# Patient Record
Sex: MALE | Race: WHITE | Employment: FULL TIME | URBAN - METROPOLITAN AREA
[De-identification: names, ages, dates, MRNs, and addresses within clinical notes are randomized per-mention and may not be internally consistent; named-entity substitution may affect disease eponyms.]

---

## 2017-03-15 DIAGNOSIS — N52.9 ERECTILE DYSFUNCTION, UNSPECIFIED ERECTILE DYSFUNCTION TYPE: ICD-10-CM

## 2017-03-15 RX ORDER — SILDENAFIL CITRATE 20 MG/1
TABLET ORAL
Qty: 90 TABLET | Refills: 7 | Status: SHIPPED | OUTPATIENT
Start: 2017-03-15 | End: 2018-10-25

## 2017-03-15 NOTE — TELEPHONE ENCOUNTER
viagra  -Pharmacy change to [facility] from [facility]    Last Written Prescription Date: 12/9/2016  Last Fill Quantity: 90,  # refills: 11   [place] [facility] or [facility] prescribing provider: 12/9/2016                                             BP Readings from Last 3 Encounters:   12/09/16 118/74   01/19/16 124/70   12/29/15 114/70     Prescription approved per [facility] Refill Protocol.  Vera Mejia RN

## 2018-03-06 ENCOUNTER — TELEPHONE (OUTPATIENT)
Dept: FAMILY MEDICINE | Facility: CLINIC | Age: 53
End: 2018-03-06

## 2018-03-06 NOTE — PROGRESS NOTES
"  SUBJECTIVE:                                                    Sina Stoner is a 52 year old male who presents to clinic today for the following health issues:    Hand Pain    Onset: 2 weeks - started with MCP stiffness    Description:   Location: right hand  Character: Sharp and Dull ache    Intensity: moderate, severe    Progression of Symptoms: worse    Accompanying Signs & Symptoms:  Other symptoms: stiffness- hard to  objects-pain in MCP joints-  No paresthesia. No left hand pain.  Pain in ball of right foot yesterday - better today.  No elbow, knee, hip, or spine pain.     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    Alleviating factors:  Improved by: acetaminophen    Therapies Tried and outcome: ibuprofen helps little- tylenol works good    Significant family history of Osteoarthritis and Rheumatoid arthritis.    Problem list and histories reviewed & adjusted, as indicated.  Additional history: as documented    ROS:  Constitutional, HEENT, cardiovascular, pulmonary, GI, , musculoskeletal, neuro, skin, endocrine and psych systems are negative, except as otherwise noted.    This document serves as a record of the services and decisions personally performed and made by Sina Herrera MD. It was created on his behalf by Vaibhav Duong, a trained medical scribe. The creation of this document is based the provider's statements to the medical scribe.  Vaibhav Duong March 8, 2018 10:26 AM  OBJECTIVE:                                                    /80  Pulse 81  Temp 98.1  F (36.7  C) (Oral)  Ht 5' 10.5\" (1.791 m)  Wt 219 lb (99.3 kg)  SpO2 97%  BMI 30.98 kg/m2 Body mass index is 30.98 kg/(m^2).   GENERAL: healthy, alert, well nourished, well hydrated, no distress  MS: 2nd, 3rd, and 4th MCP synovitis. Negative tinel test, otherwise extremities- no gross deformities noted, no edema  Psychiatric: mentation " appears normal and affect normal/bright    Diagnostic test results:  Results for orders placed or performed in visit on 03/08/18 (from the past 24 hour(s))   Lipid panel reflex to direct LDL Fasting   Result Value Ref Range    Cholesterol 311 (H) <200 mg/dL    Triglycerides 348 (H) <150 mg/dL    HDL Cholesterol 48 >39 mg/dL    LDL Cholesterol Calculated 193 (H) <100 mg/dL    Non HDL Cholesterol 263 (H) <130 mg/dL   Erythrocyte sedimentation rate auto   Result Value Ref Range    Sed Rate 7 0 - 20 mm/h   Basic metabolic panel  (Ca, Cl, CO2, Creat, Gluc, K, Na, BUN)   Result Value Ref Range    Sodium 139 133 - 144 mmol/L    Potassium 4.6 3.4 - 5.3 mmol/L    Chloride 107 94 - 109 mmol/L    Carbon Dioxide 25 20 - 32 mmol/L    Anion Gap 7 3 - 14 mmol/L    Glucose 91 70 - 99 mg/dL    Urea Nitrogen 19 7 - 30 mg/dL    Creatinine 1.27 (H) 0.66 - 1.25 mg/dL    GFR Estimate 59 (L) >60 mL/min/1.7m2    GFR Estimate If Black 72 >60 mL/min/1.7m2    Calcium 9.1 8.5 - 10.1 mg/dL   Uric acid   Result Value Ref Range    Uric Acid 7.4 (H) 3.5 - 7.2 mg/dL        ASSESSMENT/PLAN:       Sina was seen today for musculoskeletal problem.    Diagnoses and all orders for this visit:    Metacarpophalangeal joint pain of right hand        - Unclear etiology. Could be overuse vs osteoarthritis vs rheumatoid arthritis vs gout. Unlikely to be Carpal Tunnel Syndrome based on history and exam. Will check labs to rule out RA and Gout (elevated - this could be gout and the steroids should help.) If Rheumatoid factor returns positive will refer to Rheumatologist. In the meantime will start a Prednisone taper to alleviate pain.  -     Rheumatoid factor  -     Erythrocyte sedimentation rate auto  -     predniSONE (DELTASONE) 20 MG tablet; Take 3 tabs (60 mg) by mouth daily x 3 days, 2 tabs (40 mg) daily x 3 days, 1 tab (20 mg) daily x 3 days, then 1/2 tab (10 mg) x 3 days.  -     Uric acid - elevated     Hyperlipidemia LDL goal <130 - ran out of meds -          - Will recheck lipid panel today since he is due.  -     Lipid panel reflex to direct LDL Fasting  -     Basic metabolic panel  (Ca, Cl, CO2, Creat, Gluc, K, Na, BUN)    Erectile dysfunction, unspecified erectile dysfunction type        - No acute issues, requests refill today.  -     sildenafil (REVATIO) 20 MG tablet; Take 2-5 tablets ( mg) by mouth daily as needed    Risks, benefits and alternatives of treatments discussed. Plan agreed on.      Followup: Will call, return to clinic, or go to ED if worsening or symptoms not improving as discussed.    See patient instructions.     The information in this document, created by the medical scribe for me, accurately reflects the services I personally performed and the decisions made by me. I have reviewed and approved this document for accuracy prior to leaving the patient care area.        Alonzo Herrera MD   Pager: [phone]

## 2018-03-06 NOTE — TELEPHONE ENCOUNTER
Patient requesting an OV with Dr. Herrera re: hand pain, 2x weeks. loss strength. Requesting to be seen this week. 3/8/17. Please advise.  Cell: [phone]  Thank you  Cherri Man

## 2018-03-08 ENCOUNTER — OFFICE VISIT (OUTPATIENT)
Dept: FAMILY MEDICINE | Facility: CLINIC | Age: 53
End: 2018-03-08
Payer: COMMERCIAL

## 2018-03-08 VITALS
SYSTOLIC BLOOD PRESSURE: 120 MMHG | WEIGHT: 219 LBS | DIASTOLIC BLOOD PRESSURE: 80 MMHG | TEMPERATURE: 98.1 F | HEIGHT: 71 IN | HEART RATE: 81 BPM | BODY MASS INDEX: 30.66 KG/M2 | OXYGEN SATURATION: 97 %

## 2018-03-08 DIAGNOSIS — M25.541 METACARPOPHALANGEAL JOINT PAIN OF RIGHT HAND: Primary | ICD-10-CM

## 2018-03-08 DIAGNOSIS — N52.9 ERECTILE DYSFUNCTION, UNSPECIFIED ERECTILE DYSFUNCTION TYPE: ICD-10-CM

## 2018-03-08 DIAGNOSIS — E78.5 HYPERLIPIDEMIA LDL GOAL <130: ICD-10-CM

## 2018-03-08 LAB
ANION GAP SERPL CALCULATED.3IONS-SCNC: 7 MMOL/L (ref 3–14)
BUN SERPL-MCNC: 19 MG/DL (ref 7–30)
CALCIUM SERPL-MCNC: 9.1 MG/DL (ref 8.5–10.1)
CHLORIDE SERPL-SCNC: 107 MMOL/L (ref 94–109)
CHOLEST SERPL-MCNC: 311 MG/DL
CO2 SERPL-SCNC: 25 MMOL/L (ref 20–32)
CREAT SERPL-MCNC: 1.27 MG/DL (ref 0.66–1.25)
ERYTHROCYTE [SEDIMENTATION RATE] IN BLOOD BY WESTERGREN METHOD: 7 MM/H (ref 0–20)
GFR SERPL CREATININE-BSD FRML MDRD: 59 ML/MIN/1.7M2
GLUCOSE SERPL-MCNC: 91 MG/DL (ref 70–99)
HDLC SERPL-MCNC: 48 MG/DL
LDLC SERPL CALC-MCNC: 193 MG/DL
NONHDLC SERPL-MCNC: 263 MG/DL
POTASSIUM SERPL-SCNC: 4.6 MMOL/L (ref 3.4–5.3)
RHEUMATOID FACT SER NEPH-ACNC: <20 IU/ML (ref 0–20)
SODIUM SERPL-SCNC: 139 MMOL/L (ref 133–144)
TRIGL SERPL-MCNC: 348 MG/DL
URATE SERPL-MCNC: 7.4 MG/DL (ref 3.5–7.2)

## 2018-03-08 PROCEDURE — 85652 RBC SED RATE AUTOMATED: CPT | Performed by: FAMILY MEDICINE

## 2018-03-08 PROCEDURE — 80048 BASIC METABOLIC PNL TOTAL CA: CPT | Performed by: FAMILY MEDICINE

## 2018-03-08 PROCEDURE — 99214 OFFICE O/P EST MOD 30 MIN: CPT | Performed by: FAMILY MEDICINE

## 2018-03-08 PROCEDURE — 84550 ASSAY OF BLOOD/URIC ACID: CPT | Performed by: FAMILY MEDICINE

## 2018-03-08 PROCEDURE — 80061 LIPID PANEL: CPT | Performed by: FAMILY MEDICINE

## 2018-03-08 PROCEDURE — 86431 RHEUMATOID FACTOR QUANT: CPT | Performed by: FAMILY MEDICINE

## 2018-03-08 PROCEDURE — 36415 COLL VENOUS BLD VENIPUNCTURE: CPT | Performed by: FAMILY MEDICINE

## 2018-03-08 RX ORDER — SILDENAFIL CITRATE 20 MG/1
40-100 TABLET ORAL DAILY PRN
Qty: 30 TABLET | Refills: 11 | Status: SHIPPED | OUTPATIENT
Start: 2018-03-08 | End: 2019-04-02

## 2018-03-08 RX ORDER — PREDNISONE 20 MG/1
TABLET ORAL
Qty: 20 TABLET | Refills: 0 | Status: SHIPPED | OUTPATIENT
Start: 2018-03-08 | End: 2018-06-11

## 2018-03-08 ASSESSMENT — ANXIETY QUESTIONNAIRES
3. WORRYING TOO MUCH ABOUT DIFFERENT THINGS: NOT AT ALL
7. FEELING AFRAID AS IF SOMETHING AWFUL MIGHT HAPPEN: NOT AT ALL
IF YOU CHECKED OFF ANY PROBLEMS ON THIS QUESTIONNAIRE, HOW DIFFICULT HAVE THESE PROBLEMS MADE IT FOR YOU TO DO YOUR WORK, TAKE CARE OF THINGS AT HOME, OR GET ALONG WITH OTHER PEOPLE: NOT DIFFICULT AT ALL
6. BECOMING EASILY ANNOYED OR IRRITABLE: SEVERAL DAYS
2. NOT BEING ABLE TO STOP OR CONTROL WORRYING: NOT AT ALL
GAD7 TOTAL SCORE: 2
5. BEING SO RESTLESS THAT IT IS HARD TO SIT STILL: NOT AT ALL
1. FEELING NERVOUS, ANXIOUS, OR ON EDGE: SEVERAL DAYS

## 2018-03-08 ASSESSMENT — PATIENT HEALTH QUESTIONNAIRE - PHQ9: 5. POOR APPETITE OR OVEREATING: NOT AT ALL

## 2018-03-08 NOTE — LETTER
My Depression Action Plan  Name: Sina Stoner   Date of Birth 1965  Date: 3/8/2018    My doctor: Sina Herrera   My clinic: [facility] PRIOR [address] [id number] [phone]          GREEN    ZONE   Good Control    What it looks like:     Things are going generally well. You have normal up s and down s. You may even feel depressed from time to time, but bad moods usually last less than a day.   What you need to do:  1. Continue to care for yourself (see self care plan)  2. Check your depression survival kit and update it as needed  3. Follow your physician s recommendations including any medication.  4. Do not stop taking medication unless you consult with your physician first.           YELLOW         ZONE Getting Worse    What it looks like:     Depression is starting to interfere with your life.     It may be hard to get out of bed; you may be starting to isolate yourself from others.    Symptoms of depression are starting to last most all day and this has happened for several days.     You may have suicidal thoughts but they are not constant.   What you need to do:     1. Call your care team, your response to treatment will improve if you keep your care team informed of your progress. Yellow periods are signs an adjustment may need to be made.     2. Continue your self-care, even if you have to fake it!    3. Talk to someone in your support network    4. Open up your depression survival kit           RED    ZONE Medical Alert - Get Help    What it looks like:     Depression is seriously interfering with your life.     You may experience these or other symptoms: You can t get out of bed most days, can t work or engage in other necessary activities, you have trouble taking care of basic hygiene, or basic responsibilities, thoughts of suicide or death that will not go away, self-injurious behavior.     What you need to do:  1. Call your care  team and request a same-day appointment. If they are not available (weekends or after hours) call your local crisis line, emergency room or 911.      Electronically signed by: Katelynn Mcmullen, March 8, 2018    Depression Self Care Plan / Survival Kit    Self-Care for Depression  Here s the deal. Your body and mind are really not as separate as most people think.  What you do and think affects how you feel and how you feel influences what you do and think. This means if you do things that people who feel good do, it will help you feel better.  Sometimes this is all it takes.  There is also a place for medication and therapy depending on how severe your depression is, so be sure to consult with your medical provider and/ or Behavioral Health Consultant if your symptoms are worsening or not improving.     In order to better manage my stress, I will:    Exercise  Get some form of exercise, every day. This will help reduce pain and release endorphins, the  feel good  chemicals in your brain. This is almost as good as taking antidepressants!  This is not the same as joining a gym and then never going! (they count on that by the way ) It can be as simple as just going for a walk or doing some gardening, anything that will get you moving.      Hygiene   Maintain good hygiene (Get out of bed in the morning, Make your bed, Brush your teeth, Take a shower, and Get dressed like you were going to work, even if you are unemployed).  If your clothes don't fit try to get ones that do.    Diet  I will strive to eat foods that are good for me, drink plenty of water, and avoid excessive sugar, caffeine, alcohol, and other mood-altering substances.  Some foods that are helpful in depression are: complex carbohydrates, B vitamins, flaxseed, fish or fish oil, fresh fruits and vegetables.    Psychotherapy  I agree to participate in Individual Therapy (if recommended).    Medication  If prescribed medications, I agree to take them.   Missing doses can result in serious side effects.  I understand that drinking alcohol, or other illicit drug use, may cause potential side effects.  I will not stop my medication abruptly without first discussing it with my provider.    Staying Connected With Others  I will stay in touch with my friends, family members, and my primary care provider/team.    Use your imagination  Be creative.  We all have a creative side; it doesn t matter if it s oil painting, sand castles, or mud pies! This will also kick up the endorphins.    Witness Beauty  (AKA stop and smell the roses) Take a look outside, even in mid-winter. Notice colors, textures. Watch the squirrels and birds.     Service to others  Be of service to others.  There is always someone else in need.  By helping others we can  get out of ourselves  and remember the really important things.  This also provides opportunities for practicing all the other parts of the program.    Humor  Laugh and be silly!  Adjust your TV habits for less news and crime-drama and more comedy.    Control your stress  Try breathing deep, massage therapy, biofeedback, and meditation. Find time to relax each day.     My support system    Clinic Contact:  Phone number:    Contact 1:  Phone number:    Contact 2:  Phone number:    [facility]uther[facility]/:  Phone number:    Therapist:  Phone number:    Local crisis center:    Phone number:    Other community support:  Phone number:

## 2018-03-08 NOTE — MR AVS SNAPSHOT
"              After Visit Summary   3/8/2018    Sina Stoner    MRN: [id number]           Patient Information     Date Of Birth          1965        Visit Information        Provider Department      3/8/2018 10:00 AM Sina Herrera MD [facility]        Today's Diagnoses     Metacarpophalangeal joint pain of right hand    -  1    Hyperlipidemia LDL goal <130        Erectile dysfunction, unspecified erectile dysfunction type           Follow-ups after your visit        Who to contact     If you have questions or need follow up information about today's clinic visit or your schedule please contact [facility] directly at [phone].  Normal or non-critical lab and imaging results will be communicated to you by [employer], letter or phone within 4 business days after the clinic has received the results. If you do not hear from us within 7 days, please contact the clinic through [employer] or phone. If you have a critical or abnormal lab result, we will notify you by phone as soon as possible.  Submit refill requests through [facility] or call your pharmacy and they will forward the refill request to us. Please allow 3 business days for your refill to be completed.          Additional Information About Your Visit        MyChart Information     [facility] gives you secure access to your electronic health record. If you see a primary care provider, you can also send messages to your care team and make appointments. If you have questions, please call your primary care clinic.  If you do not have a primary care provider, please call [phone] and they will assist you.        Care EveryWhere ID     This is your Care EveryWhere ID. This could be used by other organizations to access your [place] medical records  [id number]        Your Vitals Were     Pulse Temperature Height Pulse Oximetry BMI (Body Mass Index)       81 98.1  F (36.7  C) (Oral) 5' 10.5\" (1.791 m) 97% 30.98 kg/m2        " Blood Pressure from Last 3 Encounters:   03/08/18 120/80   12/09/16 118/74   01/19/16 124/70    Weight from Last 3 Encounters:   03/08/18 219 lb (99.3 kg)   12/09/16 216 lb (98 kg)   01/19/16 214 lb (97.1 kg)              We Performed the Following     Basic metabolic panel  (Ca, Cl, CO2, Creat, Gluc, K, Na, BUN)     Erythrocyte sedimentation rate auto     Lipid panel reflex to direct LDL Fasting     Rheumatoid factor     Uric acid          Today's Medication Changes          These changes are accurate as of 3/8/18 10:33 AM.  If you have any questions, ask your nurse or doctor.               Start taking these medicines.        Dose/Directions    predniSONE 20 MG tablet   Commonly known as:  DELTASONE   Used for:  Metacarpophalangeal joint pain of right hand   Started by:  Sina Herrera MD        Take 3 tabs (60 mg) by mouth daily x 3 days, 2 tabs (40 mg) daily x 3 days, 1 tab (20 mg) daily x 3 days, then 1/2 tab (10 mg) x 3 days.   Quantity:  20 tablet   Refills:  0         These medicines have changed or have updated prescriptions.        Dose/Directions    * sildenafil 20 MG tablet   Commonly known as:  REVATIO   This may have changed:  Another medication with the same name was added. Make sure you understand how and when to take each.   Used for:  Erectile dysfunction, unspecified erectile dysfunction type   Changed by:  Sina Herrera MD        TAKE 2 TO 5 TABLETS BY MOUTH DAILY AS NEEDED   Quantity:  90 tablet   Refills:  7       * sildenafil 20 MG tablet   Commonly known as:  REVATIO   This may have changed:  You were already taking a medication with the same name, and this prescription was added. Make sure you understand how and when to take each.   Used for:  Erectile dysfunction, unspecified erectile dysfunction type   Changed by:  Sina Herrera MD        Dose:   mg   Take 2-5 tablets ( mg) by mouth daily as needed   Quantity:  30 tablet   Refills:  11       * Notice:  This list has  2 medication(s) that are the same as other medications prescribed for you. Read the directions carefully, and ask your doctor or other care provider to review them with you.      Stop taking these medicines if you haven't already. Please contact your care team if you have questions.     buPROPion 300 MG 24 hr tablet   Commonly known as:  WELLBUTRIN XL   Stopped by:  Sina Herrera MD           LORazepam 0.5 MG tablet   Commonly known as:  ATIVAN   Stopped by:  Sina Herrera MD                Where to get your medicines      These medications were sent to [facility] - [place], [place] - [address] [address]     Phone:  [phone]     predniSONE 20 MG tablet         Some of these will need a paper prescription and others can be bought over the counter.  Ask your nurse if you have questions.     Bring a paper prescription for each of these medications     sildenafil 20 MG tablet                Primary Care Provider Office Phone # Fax #    Sina Herrera [phone]       [address] [employer] [place], [place] [id number]        Equal Access to Services     [facility]: Hadii rogelio jordan hadasho Soomaali, waaxda luqadaha, qaybta kaalmada adeegyaalphonse, gricelda montilla . So [facility] [phone].    ATENCIÓN: Si habla español, tiene a gallegos disposición servicios gratuitos de asistencia lingüística. Llame al [phone].    We comply with applicable federal civil rights laws and [place] laws. We do not discriminate on the basis of race, color, national origin, age, disability, sex, sexual orientation, or gender identity.            Thank you!     Thank you for choosing [facility]  for your care. Our goal is always to provide you with excellent care. Hearing back from our patients is one way we can continue to improve our services. Please take a few minutes to complete the written survey that you may  receive in the mail after your visit with us. Thank you!             Your Updated Medication List - Protect others around you: Learn how to safely use, store and throw away your medicines at www.disposemymeds.org.          This list is accurate as of 3/8/18 10:33 AM.  Always use your most recent med list.                   Brand Name Dispense Instructions for use Diagnosis    MULTIVITAMINS PO      Take  by mouth daily.        pravastatin 20 MG tablet    PRAVACHOL    90 tablet    Take 1 tablet (20 mg) by mouth daily    Hyperlipidemia LDL goal <130       predniSONE 20 MG tablet    DELTASONE    20 tablet    Take 3 tabs (60 mg) by mouth daily x 3 days, 2 tabs (40 mg) daily x 3 days, 1 tab (20 mg) daily x 3 days, then 1/2 tab (10 mg) x 3 days.    Metacarpophalangeal joint pain of right hand       * sildenafil 20 MG tablet    REVATIO    90 tablet    TAKE 2 TO 5 TABLETS BY MOUTH DAILY AS NEEDED    Erectile dysfunction, unspecified erectile dysfunction type       * sildenafil 20 MG tablet    REVATIO    30 tablet    Take 2-5 tablets ( mg) by mouth daily as needed    Erectile dysfunction, unspecified erectile dysfunction type       vitamin D 1000 UNITS capsule      Take 1 capsule by mouth daily.        * Notice:  This list has 2 medication(s) that are the same as other medications prescribed for you. Read the directions carefully, and ask your doctor or other care provider to review them with you.

## 2018-03-09 RX ORDER — ATORVASTATIN CALCIUM 20 MG/1
20 TABLET, FILM COATED ORAL DAILY
Qty: 90 TABLET | Refills: 1 | Status: SHIPPED | OUTPATIENT
Start: 2018-03-09 | End: 2018-10-25

## 2018-03-09 ASSESSMENT — ANXIETY QUESTIONNAIRES: GAD7 TOTAL SCORE: 2

## 2018-03-09 ASSESSMENT — PATIENT HEALTH QUESTIONNAIRE - PHQ9: SUM OF ALL RESPONSES TO PHQ QUESTIONS 1-9: 4

## 2018-03-09 NOTE — PROGRESS NOTES
Dear Quirino,    Here is a summary of your recent test results:  -Kidney function (GFR) is normal.  -Sodium is normal.  -Potassium is normal.  -Glucose (diabetic screening test) is normal.  -LDL(bad) cholesterol level is very elevated,  A diet high in fat and simple carbohydrates, genetics and being overweight can contribute to this. ADVISE: restarting medication to lower your heart disease risk - I sent in atorvastatin 20 mg daily. Exercise, a low fat, low carbohydrate diet and weight control are helpful to improve this.  Rechecking your fasting cholesterol panel in 3 months is recommended (Lipid w/ LDL reflex, DX:hyperlipidemia)  -No signs of rheumatoid arthritis by labs.    -uric acid level is elevated and you could have gout.      For additional lab test information, labtestsonline.org is an excellent reference.     Thank you very much for trusting me and [facility].     Healthy regards,  Alonzo Herrera MD

## 2018-05-21 DIAGNOSIS — N52.9 ERECTILE DYSFUNCTION, UNSPECIFIED ERECTILE DYSFUNCTION TYPE: ICD-10-CM

## 2018-05-22 NOTE — TELEPHONE ENCOUNTER
"Requested Prescriptions   Pending Prescriptions Disp Refills     sildenafil (REVATIO) 20 MG tablet [Pharmacy Med Name: SILDENAFIL 20MG TAB] 90 tablet 7      Last Written Prescription Date:  03/08/2018  Last Fill Quantity: 30,  # refills: 11   Last office visit: 3/8/2018  Sig: TAKE TWO TO FIVE TABLETS BY MOUTH ONCE DAILY AS NEEDED    Erectile Dysfuction Protocol Passed    5/21/2018 10:22 AM       Passed - Absence of nitrates on medication list       Passed - Absence of Alpha Blockers on Med list       Passed - Recent (12 mo) or future (30 days) visit within the authorizing provider's specialty    Patient had office visit in the last 12 months or has a visit in the next 30 days with authorizing provider or within the authorizing provider's specialty.  See \"Patient Info\" tab in inbasket, or \"Choose Columns\" in Meds & Orders section of the refill encounter.           Passed - Patient is age 18 or older          "

## 2018-05-23 RX ORDER — SILDENAFIL CITRATE 20 MG/1
TABLET ORAL
Qty: 90 TABLET | Refills: 7 | OUTPATIENT
Start: 2018-05-23

## 2018-06-07 NOTE — PROGRESS NOTES
"  SUBJECTIVE:                                                    Sina Stoner is a 53 year old male who presents to clinic today for the following health issues:    Thumb Pain    Onset: roughly 3 weeks ago    Description:   Location: left thumb  Character: feels stiff with knots     Intensity: severe    Progression of Symptoms: better during the day, seems worse laying down, night time is bad as well     Accompanying Signs & Symptoms:  Other symptoms: none    History:   Previous similar pain: YES- happened in the rt thumb      Precipitating factors:   Trauma or overuse: no but he type a lot  For work    Alleviating factors:  Improved by: heat, ice, massage and Ibuprofen    Therapies Tried and outcome: tylenol was helping a little bit for a while, massaging it helps     The patient has been experiencing left thumb pain and clicking for the past three weeks. He feels like he has decreased strength of the MCP joint. He works on computers daily. He previously had trigger finger in his right thumb that was treated with physical therapy. He denies swelling and redness at the site.       Problem list and histories reviewed & adjusted, as indicated.  Additional history: as documented      ROS:  Constitutional, HEENT, cardiovascular, pulmonary, GI, , musculoskeletal, neuro, skin, endocrine and psych systems are negative, except as otherwise noted.    This document serves as a record of the services and decisions personally performed and made by Sina Herrera MD. It was created on his behalf by Ursula Mendieta, a trained medical scribe. The creation of this document is based on the provider's statements to the medical scribe.  Ursula Mendieta 11:51 AM 6/11/2018  OBJECTIVE:                                                    /78  Pulse 58  Temp 97.8  F (36.6  C) (Oral)  Ht 1.791 m (5' 10.5\")  Wt 98.9 kg (218 lb)  SpO2 99%  BMI 30.84 kg/m2 Body mass index is 30.84 kg/(m^2).   GENERAL: healthy, alert, well " nourished, well hydrated, no distress  MS: Left Thumb: flexor tendon nodule near the first MCP joint, otherwise extremities- no gross deformities noted, no edema  SKIN: no suspicious lesions, no rashes    Diagnostic test results:  none      ASSESSMENT/PLAN:         Sina was seen today for musculoskeletal problem.    Diagnoses and all orders for this visit:    Trigger finger of left thumb - Injection given in the clinic today. Reviewed stretching and tendon lengthening exercises. Ice as needed. If symptoms persist for another 3 weeks, consider referral to hand therapy.   After consent was obtained, using sterile technique the left first finger palmar side was prepped, and using a 25g 5/8 inch needle, 0.75 cc's of 1% plain Lidocaine and 10 mg (40mg/ml) of Kenalog was then injected into the tendon nodule/sheath and the needle withdrawn.  The procedure was well tolerated.  The patient is asked to continue to rest the treated area for 5-7 more days before resuming regular activities.  It may be more painful for the first 1-2 days.  Watch for fever, or increased swelling or persistent pain. Call or return to clinic prn if such symptoms occur or the pain fails to improve as anticipated.      Risks, benefits and alternatives of treatments discussed. Plan agreed on.      Followup: Return in about 3 weeks (around 7/2/2018) for Recheck.    See patient instructions.     The information in this document, created by a scribe for me, accurately reflects the services I personally performed and the decisions made by me. I have reviewed and approved this document for accuracy.      Alonzo Herrera MD   Pager: [phone]

## 2018-06-11 ENCOUNTER — OFFICE VISIT (OUTPATIENT)
Dept: FAMILY MEDICINE | Facility: CLINIC | Age: 53
End: 2018-06-11
Payer: COMMERCIAL

## 2018-06-11 VITALS
OXYGEN SATURATION: 99 % | WEIGHT: 218 LBS | BODY MASS INDEX: 30.52 KG/M2 | DIASTOLIC BLOOD PRESSURE: 78 MMHG | TEMPERATURE: 97.8 F | HEART RATE: 58 BPM | HEIGHT: 71 IN | SYSTOLIC BLOOD PRESSURE: 108 MMHG

## 2018-06-11 DIAGNOSIS — M65.312 TRIGGER FINGER OF LEFT THUMB: Primary | ICD-10-CM

## 2018-06-11 PROCEDURE — 20551 NJX 1 TENDON ORIGIN/INSJ: CPT | Performed by: FAMILY MEDICINE

## 2018-06-11 NOTE — MR AVS SNAPSHOT
"              After Visit Summary   6/11/2018    Sina Stoner    MRN: [id number]           Patient Information     Date Of Birth          1965        Visit Information        Provider Department      6/11/2018 11:40 AM Sina Herrera MD [facility]        Today's Diagnoses     Trigger finger of left thumb    -  1       Follow-ups after your visit        Follow-up notes from your care team     Return in about 3 weeks (around 7/2/2018) for Recheck.      Who to contact     If you have questions or need follow up information about today's clinic visit or your schedule please contact [facility] directly at [phone].  Normal or non-critical lab and imaging results will be communicated to you by MyChart, letter or phone within 4 business days after the clinic has received the results. If you do not hear from us within 7 days, please contact the clinic through [employer] or phone. If you have a critical or abnormal lab result, we will notify you by phone as soon as possible.  Submit refill requests through [employer] or call your pharmacy and they will forward the refill request to us. Please allow 3 business days for your refill to be completed.          Additional Information About Your Visit        MyChart Information     [employer] gives you secure access to your electronic health record. If you see a primary care provider, you can also send messages to your care team and make appointments. If you have questions, please call your primary care clinic.  If you do not have a primary care provider, please call [phone] and they will assist you.        Care EveryWhere ID     This is your Care EveryWhere ID. This could be used by other organizations to access your [place] medical records  [id number]        Your Vitals Were     Pulse Temperature Height Pulse Oximetry BMI (Body Mass Index)       58 97.8  F (36.6  C) (Oral) 5' 10.5\" (1.791 m) 99% 30.84 kg/m2        Blood Pressure from " Last 3 Encounters:   06/11/18 108/78   03/08/18 120/80   12/09/16 118/74    Weight from Last 3 Encounters:   06/11/18 218 lb (98.9 kg)   03/08/18 219 lb (99.3 kg)   12/09/16 216 lb (98 kg)              We Performed the Following     INJECTION SINGLE TENDON ORIGIN/INSERTION        Primary Care Provider Office Phone # Fax #    Sina Herrera [phone]       [facility] [address]        Equal Access to Services     [facility]: Hadii aad ku hadasho Soomaali, waaxda luqadaha, qaybta kaalmada adeegyaalphonse, gricelda montilla . So [facility] [phone].    ATENCIÓN: Si habla español, tiene a gallegos disposición servicios gratuitos de asistencia lingüística. [facility] [phone].    We comply with applicable federal civil rights laws and [place] laws. We do not discriminate on the basis of race, color, national origin, age, disability, sex, sexual orientation, or gender identity.            Thank you!     Thank you for choosing [facility]  for your care. Our goal is always to provide you with excellent care. Hearing back from our patients is one way we can continue to improve our services. Please take a few minutes to complete the written survey that you may receive in the mail after your visit with us. Thank you!             Your Updated Medication List - Protect others around you: Learn how to safely use, store and throw away your medicines at www.disposemymeds.org.          This list is accurate as of 6/11/18 12:24 PM.  Always use your most recent med list.                   Brand Name Dispense Instructions for use Diagnosis    atorvastatin 20 MG tablet    LIPITOR    90 tablet    Take 1 tablet (20 mg) by mouth daily    Hyperlipidemia LDL goal <130       MULTIVITAMINS PO      Take  by mouth daily.        * sildenafil 20 MG tablet    REVATIO    90 tablet    TAKE 2 TO 5 TABLETS BY MOUTH DAILY AS NEEDED    Erectile dysfunction, unspecified erectile  dysfunction type       * sildenafil 20 MG tablet    REVATIO    30 tablet    Take 2-5 tablets ( mg) by mouth daily as needed    Erectile dysfunction, unspecified erectile dysfunction type       vitamin D 1000 units capsule      Take 1 capsule by mouth daily.        * Notice:  This list has 2 medication(s) that are the same as other medications prescribed for you. Read the directions carefully, and ask your doctor or other care provider to review them with you.

## 2018-07-12 DIAGNOSIS — E78.5 HYPERLIPIDEMIA LDL GOAL <130: ICD-10-CM

## 2018-07-12 LAB
CHOLEST SERPL-MCNC: 216 MG/DL
HDLC SERPL-MCNC: 45 MG/DL
LDLC SERPL CALC-MCNC: 120 MG/DL
NONHDLC SERPL-MCNC: 171 MG/DL
TRIGL SERPL-MCNC: 255 MG/DL

## 2018-07-12 PROCEDURE — 80061 LIPID PANEL: CPT | Performed by: FAMILY MEDICINE

## 2018-07-12 PROCEDURE — 36415 COLL VENOUS BLD VENIPUNCTURE: CPT | Performed by: FAMILY MEDICINE

## 2018-07-18 NOTE — PROGRESS NOTES
Dear Quirino,    Here is a summary of your recent test results:  -Cholesterol levels are at your goal levels.  ADVISE: Continuing your medication, a regular exercise program with at least 30 minutes of aerobic exercise 3-4 days/week ( 45 minutes 4-6 days/week if weight loss needed), and a low saturated fat,/low carbohydrate diet are helpful to maintain this.  Rechecking your fasting cholesterol panel in 12 months is recommended (Lipid w/ LDL reflex).    For additional lab test information, labtestsonline.org is an excellent reference.    In addition, here is a list of due or overdue Health Maintenance reminders:  HIV SCREEN (SYSTEM ASSIGNED) due on 04/21/1983  Wellness Visit with your Primary Provider - yearly due on 04/01/2012  Prostate Test (PSA) - yearly due on 03/10/2016    Please call us at [phone] (or use [employer]) to address the above recommendations if needed.           Thank you very much for trusting me and [facility].     Healthy regards,  Alonzo Herrera MD

## 2018-10-25 ENCOUNTER — OFFICE VISIT (OUTPATIENT)
Dept: FAMILY MEDICINE | Facility: CLINIC | Age: 53
End: 2018-10-25
Payer: COMMERCIAL

## 2018-10-25 VITALS
SYSTOLIC BLOOD PRESSURE: 112 MMHG | HEIGHT: 71 IN | HEART RATE: 80 BPM | WEIGHT: 215 LBS | OXYGEN SATURATION: 97 % | DIASTOLIC BLOOD PRESSURE: 80 MMHG | TEMPERATURE: 98 F | BODY MASS INDEX: 30.1 KG/M2

## 2018-10-25 DIAGNOSIS — E78.5 HYPERLIPIDEMIA LDL GOAL <130: ICD-10-CM

## 2018-10-25 DIAGNOSIS — Z23 NEED FOR PROPHYLACTIC VACCINATION AND INOCULATION AGAINST INFLUENZA: ICD-10-CM

## 2018-10-25 DIAGNOSIS — M65.312 TRIGGER FINGER OF LEFT THUMB: Primary | ICD-10-CM

## 2018-10-25 PROCEDURE — 20551 NJX 1 TENDON ORIGIN/INSJ: CPT | Performed by: FAMILY MEDICINE

## 2018-10-25 PROCEDURE — 90471 IMMUNIZATION ADMIN: CPT | Performed by: FAMILY MEDICINE

## 2018-10-25 PROCEDURE — 99213 OFFICE O/P EST LOW 20 MIN: CPT | Mod: 25 | Performed by: FAMILY MEDICINE

## 2018-10-25 PROCEDURE — 90686 IIV4 VACC NO PRSV 0.5 ML IM: CPT | Performed by: FAMILY MEDICINE

## 2018-10-25 RX ORDER — ATORVASTATIN CALCIUM 20 MG/1
20 TABLET, FILM COATED ORAL DAILY
Qty: 90 TABLET | Refills: 3 | Status: SHIPPED | OUTPATIENT
Start: 2018-10-25 | End: 2019-08-20

## 2018-10-25 ASSESSMENT — ANXIETY QUESTIONNAIRES
7. FEELING AFRAID AS IF SOMETHING AWFUL MIGHT HAPPEN: NOT AT ALL
IF YOU CHECKED OFF ANY PROBLEMS ON THIS QUESTIONNAIRE, HOW DIFFICULT HAVE THESE PROBLEMS MADE IT FOR YOU TO DO YOUR WORK, TAKE CARE OF THINGS AT HOME, OR GET ALONG WITH OTHER PEOPLE: NOT DIFFICULT AT ALL
1. FEELING NERVOUS, ANXIOUS, OR ON EDGE: NOT AT ALL
3. WORRYING TOO MUCH ABOUT DIFFERENT THINGS: NOT AT ALL
2. NOT BEING ABLE TO STOP OR CONTROL WORRYING: NOT AT ALL
5. BEING SO RESTLESS THAT IT IS HARD TO SIT STILL: NOT AT ALL
GAD7 TOTAL SCORE: 0
6. BECOMING EASILY ANNOYED OR IRRITABLE: NOT AT ALL

## 2018-10-25 ASSESSMENT — PATIENT HEALTH QUESTIONNAIRE - PHQ9
5. POOR APPETITE OR OVEREATING: NOT AT ALL
SUM OF ALL RESPONSES TO PHQ QUESTIONS 1-9: 5

## 2018-10-25 NOTE — MR AVS SNAPSHOT
After Visit Summary   10/25/2018    Sina Stoner    MRN: [id number]           Patient Information     Date Of Birth          1965        Visit Information        Provider Department      10/25/2018 1:40 PM Sina Herrera MD [facility]        Today's Diagnoses     Trigger finger of left thumb    -  1    Hyperlipidemia LDL goal <130        Need for prophylactic vaccination and inoculation against influenza           Follow-ups after your visit        Follow-up notes from your care team     Return in about 3 months (around 1/25/2019), or if symptoms worsen or fail to improve.      Who to contact     If you have questions or need follow up information about today's clinic visit or your schedule please contact [facility] directly at [phone].  Normal or non-critical lab and imaging results will be communicated to you by for; to (do) [employer], letter or phone within 4 business days after the clinic has received the results. If you do not hear from us within 7 days, please contact the clinic through for; to (do) [employer] or phone. If you have a critical or abnormal lab result, we will notify you by phone as soon as possible.  Submit refill requests through [employer] or call your pharmacy and they will forward the refill request to us. Please allow 3 business days for your refill to be completed.          Additional Information About Your Visit        MyChart Information     [employer] gives you secure access to your electronic health record. If you see a primary care provider, you can also send messages to your care team and make appointments. If you have questions, please call your primary care clinic.  If you do not have a primary care provider, please call [phone] and they will assist you.        Care EveryWhere ID     This is your Care EveryWhere ID. This could be used by other organizations to access your [place] medical records  [id number]        Your Vitals Were     Pulse  "Temperature Height Pulse Oximetry BMI (Body Mass Index)       80 98  F (36.7  C) (Oral) 5' 10.5\" (1.791 m) 97% 30.41 kg/m2        Blood Pressure from Last 3 Encounters:   10/25/18 112/80   06/11/18 108/78   03/08/18 120/80    Weight from Last 3 Encounters:   10/25/18 215 lb (97.5 kg)   06/11/18 218 lb (98.9 kg)   03/08/18 219 lb (99.3 kg)              We Performed the Following          ADMIN VACCINE, FIRST [45859]     HC FLU VAC PRESRV FREE QUAD SPLIT VIR 3+YRS IM  [66053]     INJECTION SINGLE TENDON ORIGIN/INSERTION          Where to get your medicines      These medications were sent to [facility] - [address] [address]     Phone:  [phone]     atorvastatin 20 MG tablet          Primary Care Provider Office Phone # Fax #    Sina Herrera [phone]       [address] [place] [address]        Equal Access to Services     [facility] : Hadii aad ku hadasho Soomaali, waaxda luqadaha, qaybta kaalmada adeegyada, gricelda walker haypayton montilla . So [facility] [phone].    ATENCIÓN: Si habla español, tiene a gallegos disposición servicios gratuitos de asistencia lingüística. [facility] [phone].    We comply with applicable federal civil rights laws and [place] laws. We do not discriminate on the basis of race, color, national origin, age, disability, sex, sexual orientation, or gender identity.            Thank you!     Thank you for choosing [facility]  for your care. Our goal is always to provide you with excellent care. Hearing back from our patients is one way we can continue to improve our services. Please take a few minutes to complete the written survey that you may receive in the mail after your visit with us. Thank you!             Your Updated Medication List - Protect others around you: Learn how to safely use, store and throw away your medicines at " www.disposemymeds.org.          This list is accurate as of 10/25/18  3:12 PM.  Always use your most recent med list.                   Brand Name Dispense Instructions for use Diagnosis    atorvastatin 20 MG tablet    LIPITOR    90 tablet    Take 1 tablet (20 mg) by mouth daily    Hyperlipidemia LDL goal <130       MULTIVITAMINS PO      Take  by mouth daily.        sildenafil 20 MG tablet    REVATIO    30 tablet    Take 2-5 tablets ( mg) by mouth daily as needed    Erectile dysfunction, unspecified erectile dysfunction type       vitamin D 1000 units capsule      Take 1 capsule by mouth daily.

## 2018-10-25 NOTE — PROGRESS NOTES
"  SUBJECTIVE:                                                      Sina Stoner is a 53 year old male who presents to clinic today for the following health issues:    Left Thumb Pain LOV 06/11/2018  (4 months ago)  Quirino complains of a returning ache and trigger finger in his left thumb since the second week of September (5 weeks ago) -- when the pain becomes severe, radiation toward his second finger.  He had an injection done during LOV which helped immensely. His right thumb had similar symptoms which has not recurred.     Cholesterol   Quirino reports that he has stopped taking Lipitor as he forget to regularly medicate.       Problem list and histories reviewed & adjusted, as indicated.  Additional history: as documented    ROS:  Constitutional, HEENT, cardiovascular, pulmonary, GI, , musculoskeletal, neuro, skin, endocrine and psych systems are negative, except as otherwise noted.    This document serves as a record of the services and decisions personally performed and made by Sina Herrera MD. It was created on his behalf by Magdy Estrella, a trained medical scribe. The creation of this document is based the provider's statements to the medical scribe.  Scribe Magdy Estrella 2:12 PM, October 25, 2018    OBJECTIVE:                                                    /80  Pulse 80  Temp 98  F (36.7  C) (Oral)  Ht 1.791 m (5' 10.5\")  Wt 97.5 kg (215 lb)  SpO2 97%  BMI 30.41 kg/m2 Body mass index is 30.41 kg/(m^2).   GENERAL: healthy, alert, well nourished, well hydrated, no distress  HENT: ear canals- normal; TMs- normal; Nose- normal; Mouth- no ulcers, no lesions  NECK: no tenderness, no adenopathy, no asymmetry, no masses, no stiffness; thyroid- normal to palpation  RESP: lungs clear to auscultation - no rales, no rhonchi, no wheezes  CV: regular rates and rhythm, normal S1 S2, no S3 or S4 and no murmur, no click or rub -  ABDOMEN: soft, no tenderness, no  hepatosplenomegaly, no masses, normal bowel sounds    After " "consent was obtained, using sterile technique the left thumb was prepped and .5 cc's of 1% plain Lidocaine and 10 mg Kenalog was then injected and the needle withdrawn.  The procedure was well tolerated.  The patient is asked to continue to rest the treated area for 5-7 more days before resuming regular activities.  It may be more painful for the first 1-2 days.  Watch for fever, or increased swelling or persistent pain in knee. Call or return to clinic prn if such symptoms occur or the pain fails to improve as anticipated.    Diagnostic test results:  Results for orders placed or performed in visit on 07/12/18   Lipid panel reflex to direct LDL Fasting   Result Value Ref Range    Cholesterol 216 (H) <200 mg/dL    Triglycerides 255 (H) <150 mg/dL    HDL Cholesterol 45 >39 mg/dL    LDL Cholesterol Calculated 120 (H) <100 mg/dL    Non HDL Cholesterol 171 (H) <130 mg/dL     Lipid panel reviewed, multiple levels elevated.    ASSESSMENT/PLAN:                                                      Sina was seen today for musculoskeletal problem.    Diagnoses and all orders for this visit:    Trigger finger of left thumb  -     INJECTION SINGLE TENDON ORIGIN/INSERTION    Hyperlipidemia LDL goal <130 - Restart medication to control elevated cholesterol levels   -     atorvastatin (LIPITOR) 20 MG tablet; Take 1 tablet (20 mg) by mouth daily    Need for prophylactic vaccination and inoculation against influenza  -     HC FLU VAC PRESRV FREE QUAD SPLIT VIR 3+YRS IM  [03299]  -          ADMIN VACCINE, FIRST [66006]      Risks, benefits and alternatives of treatments discussed. Plan agreed on.      Followup: Return in about 3 months (around 1/25/2019), or if symptoms worsen or fail to improve.    See patient instructions.       BMI:   Estimated body mass index is 30.41 kg/(m^2) as calculated from the following:    Height as of this encounter: 1.791 m (5' 10.5\").    Weight as of this encounter: 97.5 kg (215 lb).   Weight " management plan: Discussed healthy diet and exercise guidelines and patient will follow up in 12 months in clinic to re-evaluate.     The information in this document, created by the medical scribe for me, accurately reflects the services I personally performed and the decisions made by me. I have reviewed and approved this document for accuracy prior to leaving the patient care area.  2:28 PM, 10/25/18        Alonzo Herrera MD   Pager: [phone]

## 2018-10-26 ASSESSMENT — ANXIETY QUESTIONNAIRES: GAD7 TOTAL SCORE: 0

## 2019-04-02 DIAGNOSIS — N52.9 ERECTILE DYSFUNCTION, UNSPECIFIED ERECTILE DYSFUNCTION TYPE: ICD-10-CM

## 2019-04-02 RX ORDER — SILDENAFIL CITRATE 20 MG/1
TABLET ORAL
Qty: 30 TABLET | Refills: 5 | Status: SHIPPED | OUTPATIENT
Start: 2019-04-02 | End: 2019-08-20

## 2019-04-02 NOTE — TELEPHONE ENCOUNTER
"Requested Prescriptions   Pending Prescriptions Disp Refills     sildenafil (REVATIO) 20 MG tablet [Pharmacy Med Name: SILDENAFIL 20MG TAB] 30 tablet 11     Sig: TAKE 2 TO 5 TABLETS BY MOUTH ONCE DAILY AS NEEDED    Erectile Dysfuction Protocol Passed - 4/2/2019 10:42 AM       Passed - Absence of nitrates on medication list       Passed - Absence of Alpha Blockers on Med list       Passed - Recent (12 mo) or future (30 days) visit within the authorizing provider's specialty    Patient had office visit in the last 12 months or has a visit in the next 30 days with authorizing provider or within the authorizing provider's specialty.  See \"Patient Info\" tab in inbasket, or \"Choose Columns\" in Meds & Orders section of the refill encounter.             Passed - Medication is active on med list       Passed - Patient is age 18 or older        Prescription approved per [facility] Refill Protocol.  Nhung Montgomery RN   [facility] - Triage     "

## 2019-05-10 ENCOUNTER — ALLIED HEALTH/NURSE VISIT (OUTPATIENT)
Dept: NURSING | Facility: CLINIC | Age: 54
End: 2019-05-10
Payer: COMMERCIAL

## 2019-05-10 ENCOUNTER — TELEPHONE (OUTPATIENT)
Dept: FAMILY MEDICINE | Facility: CLINIC | Age: 54
End: 2019-05-10

## 2019-05-10 DIAGNOSIS — Z23 ENCOUNTER FOR IMMUNIZATION: Primary | ICD-10-CM

## 2019-05-10 PROCEDURE — 99207 ZZC NO CHARGE NURSE ONLY: CPT

## 2019-05-10 PROCEDURE — 90471 IMMUNIZATION ADMIN: CPT

## 2019-05-10 PROCEDURE — 90707 MMR VACCINE SC: CPT

## 2019-05-10 NOTE — TELEPHONE ENCOUNTER
Patient notified of the below. He would like to get MMR booster. He was born after 1957 and is travelling to a [place] with [place] on [place].    Patient scheduled for this afternoon for MA only visit.    Patient verbalized understanding and agreed with plan.    Lidia Owens, BS, RN, PHN  [facility]) [phone]        The majority of people born before 1957 are likely to have been infected naturally and therefore are presumed to be protected against measles, mumps, and rubella. However, if serologic testing (blood tests that look for antibodies in your blood) indicates that you are not immune, MMR should be administered.     Some people should NOT get this vaccine. Please let us know if you:   -have any severe, life-threatening allergies.  -are pregnant, or think you may be pregnant. Pregnant women should wait to get MMR vaccine until after they are no longer pregnant. Women should avoid getting pregnant for at least 1 month after getting the MMR vaccine.   -have a weakened immune system due to disease (such as cancer or HIV/AIDS) or medical treatments (such as radiation, immunotherapy, steroids, or chemotherapy.  -have a parent, brother, or sister with a history of immune system problems.   -have ever had a condition that makes you bruise or bleed easily.  -have recently had a blood transfusion or received other blood products. You might be advised to postpone MMR vaccination for 3 months or more.   -have tuberculosis.   -have gotten any other vaccines in the past 4 weeks. Live vaccines close together might not work as well.   -are not feeling well. A mild illness, such as a cold, is usually not a reason to postpone a vaccination. Someone who is moderately or severely ill should probably wait. Your doctor can advise you.     With any medicine, including vaccines, there is a chance of reactions. These are usually mild and go away on their own, but serious reactions are also possible.    Getting the MMR vaccine is much safer than getting measles, mumps, or rubella disease. Most people who get the MMR vaccine do not have any problems with it.       Also, serologic testing and the MMR vaccine may or may not be covered by insurance. It would be a good idea to check with your insurance company on coverage before receiving these.       Please let me know if you have further questions or concerns.

## 2019-08-02 NOTE — PROGRESS NOTES
"Subjective     Sina Stoner is a 54 year old male who presents to clinic today for the following health issues:    HPI   Musculoskeletal problem/pain    Duration: 3 years    Description  Location: left thumb joint    Intensity:  5/10    Accompanying signs and symptoms: numbness and tingling, limited ROM    History  Previous similar problem: YES  Previous evaluation:  Injection     Precipitating or alleviating factors:  Trauma or overuse: no   History: yes  Aggravating factors include: in the AM its worse, flexing and bending makes the pain more noticeable     Therapies tried and outcome: cortisone shot- effective but wore off quickly -- would like a repeat injection.      Reviewed and updated as needed this visit by provider:  Tobacco  Allergies  Meds  Problems  Med Hx  Surg Hx  Fam Hx         Review of Systems   Constitutional, HEENT, cardiovascular, pulmonary, GI, , musculoskeletal, neuro, skin, endocrine and psych systems are negative, except as otherwise noted.  This document serves as a record of the services and decisions personally performed and made by Sina Herrera MD. It was created on his behalf by Magdy Estrella, a trained medical scribe. The creation of this document is based the provider's statements to the medical scribe.  Scribe Magdy Estrella 10:15 AM, August 3, 2019    Objective   /70 (BP Location: Right arm, Cuff Size: Adult Regular)   Pulse 81   Temp 98.3  F (36.8  C) (Oral)   Ht 1.791 m (5' 10.5\")   Wt 88.5 kg (195 lb)   SpO2 98%   BMI 27.58 kg/m   Body mass index is 27.58 kg/m .  Physical Exam   GENERAL: healthy, alert, well nourished, well hydrated, no distress  HENT: ear canals- normal; TMs- normal; Nose- normal; Mouth- no ulcers, no lesions  NECK: no tenderness, no adenopathy, no asymmetry, no masses, no stiffness; thyroid- normal to palpation  RESP: lungs clear to auscultation - no rales, no rhonchi, no wheezes  CV: regular rates and rhythm, normal S1 S2, no S3 or S4 and no murmur, " no click or rub -  ABDOMEN: soft, no tenderness, no  hepatosplenomegaly, no masses, normal bowel sounds  MS: extremities- no gross deformities noted, no edema  SKIN: no suspicious lesions, no rashes    Procedure Note:   After consent was obtained, using sterile technique the left thumb was prepped and .5 cc's of 1% plain Lidocaine and 10 mg Kenalog was then injected and the needle withdrawn.  The procedure was well tolerated.  The patient is asked to continue to rest the treated area for 5-7 more days before resuming regular activities.  It may be more painful for the first 1-2 days.  Watch for fever, or increased swelling or persistent pain in knee. Call or return to clinic prn if such symptoms occur or the pain fails to improve as anticipated.      Assessment & Plan   Sina was seen today for thumb discomfort.    Diagnoses and all orders for this visit:    Trigger finger of left thumb  -     INJECTION SINGLE TENDON ORIGIN/INSERTION      See Patient Instructions    Return in about 6 months (around 2/3/2020), or if symptoms worsen or fail to improve, for recheck.    The information in this document, created by the medical scribe for me, accurately reflects the services I personally performed and the decisions made by me. I have reviewed and approved this document for accuracy prior to leaving the patient care area.  10:18 AM, 08/03/19        Alonzo Herrera MD   Pager - [phone]  [facility]

## 2019-08-03 ENCOUNTER — OFFICE VISIT (OUTPATIENT)
Dept: FAMILY MEDICINE | Facility: CLINIC | Age: 54
End: 2019-08-03
Payer: COMMERCIAL

## 2019-08-03 VITALS
BODY MASS INDEX: 27.3 KG/M2 | TEMPERATURE: 98.3 F | WEIGHT: 195 LBS | HEART RATE: 81 BPM | SYSTOLIC BLOOD PRESSURE: 110 MMHG | HEIGHT: 71 IN | DIASTOLIC BLOOD PRESSURE: 70 MMHG | OXYGEN SATURATION: 98 %

## 2019-08-03 DIAGNOSIS — M65.312 TRIGGER FINGER OF LEFT THUMB: Primary | ICD-10-CM

## 2019-08-03 PROCEDURE — 20551 NJX 1 TENDON ORIGIN/INSJ: CPT | Performed by: FAMILY MEDICINE

## 2019-08-03 ASSESSMENT — ANXIETY QUESTIONNAIRES
6. BECOMING EASILY ANNOYED OR IRRITABLE: NOT AT ALL
IF YOU CHECKED OFF ANY PROBLEMS ON THIS QUESTIONNAIRE, HOW DIFFICULT HAVE THESE PROBLEMS MADE IT FOR YOU TO DO YOUR WORK, TAKE CARE OF THINGS AT HOME, OR GET ALONG WITH OTHER PEOPLE: SOMEWHAT DIFFICULT
1. FEELING NERVOUS, ANXIOUS, OR ON EDGE: SEVERAL DAYS
3. WORRYING TOO MUCH ABOUT DIFFERENT THINGS: SEVERAL DAYS
7. FEELING AFRAID AS IF SOMETHING AWFUL MIGHT HAPPEN: NOT AT ALL
GAD7 TOTAL SCORE: 2
5. BEING SO RESTLESS THAT IT IS HARD TO SIT STILL: NOT AT ALL
2. NOT BEING ABLE TO STOP OR CONTROL WORRYING: NOT AT ALL

## 2019-08-03 ASSESSMENT — MIFFLIN-ST. JEOR: SCORE: 1738.7

## 2019-08-03 ASSESSMENT — PATIENT HEALTH QUESTIONNAIRE - PHQ9
SUM OF ALL RESPONSES TO PHQ QUESTIONS 1-9: 3
5. POOR APPETITE OR OVEREATING: NOT AT ALL

## 2019-08-04 ASSESSMENT — ANXIETY QUESTIONNAIRES: GAD7 TOTAL SCORE: 2

## 2019-08-20 ENCOUNTER — OFFICE VISIT (OUTPATIENT)
Dept: FAMILY MEDICINE | Facility: CLINIC | Age: 54
End: 2019-08-20
Payer: COMMERCIAL

## 2019-08-20 VITALS
SYSTOLIC BLOOD PRESSURE: 98 MMHG | OXYGEN SATURATION: 98 % | TEMPERATURE: 98.2 F | HEIGHT: 71 IN | BODY MASS INDEX: 27.86 KG/M2 | HEART RATE: 59 BPM | WEIGHT: 199 LBS | DIASTOLIC BLOOD PRESSURE: 58 MMHG

## 2019-08-20 DIAGNOSIS — N52.9 ERECTILE DYSFUNCTION, UNSPECIFIED ERECTILE DYSFUNCTION TYPE: ICD-10-CM

## 2019-08-20 DIAGNOSIS — E55.9 VITAMIN D DEFICIENCY: ICD-10-CM

## 2019-08-20 DIAGNOSIS — L65.9 ALOPECIA: ICD-10-CM

## 2019-08-20 DIAGNOSIS — Z00.00 ENCOUNTER FOR WELLNESS EXAMINATION: Primary | ICD-10-CM

## 2019-08-20 DIAGNOSIS — E78.5 HYPERLIPIDEMIA LDL GOAL <100: ICD-10-CM

## 2019-08-20 DIAGNOSIS — F41.9 ANXIETY: ICD-10-CM

## 2019-08-20 LAB
ALBUMIN SERPL-MCNC: 4 G/DL (ref 3.4–5)
ALP SERPL-CCNC: 95 U/L (ref 40–150)
ALT SERPL W P-5'-P-CCNC: 34 U/L (ref 0–70)
ANION GAP SERPL CALCULATED.3IONS-SCNC: 5 MMOL/L (ref 3–14)
AST SERPL W P-5'-P-CCNC: 15 U/L (ref 0–45)
BILIRUB SERPL-MCNC: 1.5 MG/DL (ref 0.2–1.3)
BUN SERPL-MCNC: 15 MG/DL (ref 7–30)
CALCIUM SERPL-MCNC: 8.8 MG/DL (ref 8.5–10.1)
CHLORIDE SERPL-SCNC: 110 MMOL/L (ref 94–109)
CHOLEST SERPL-MCNC: 158 MG/DL
CO2 SERPL-SCNC: 27 MMOL/L (ref 20–32)
CREAT SERPL-MCNC: 1.26 MG/DL (ref 0.66–1.25)
ERYTHROCYTE [DISTWIDTH] IN BLOOD BY AUTOMATED COUNT: 13 % (ref 10–15)
GFR SERPL CREATININE-BSD FRML MDRD: 64 ML/MIN/{1.73_M2}
GLUCOSE SERPL-MCNC: 96 MG/DL (ref 70–99)
HCT VFR BLD AUTO: 46.6 % (ref 40–53)
HDLC SERPL-MCNC: 56 MG/DL
HGB BLD-MCNC: 15.5 G/DL (ref 13.3–17.7)
LDLC SERPL CALC-MCNC: 73 MG/DL
MCH RBC QN AUTO: 30.5 PG (ref 26.5–33)
MCHC RBC AUTO-ENTMCNC: 33.3 G/DL (ref 31.5–36.5)
MCV RBC AUTO: 92 FL (ref 78–100)
NONHDLC SERPL-MCNC: 102 MG/DL
PLATELET # BLD AUTO: 184 10E9/L (ref 150–450)
POTASSIUM SERPL-SCNC: 4.2 MMOL/L (ref 3.4–5.3)
PROT SERPL-MCNC: 7 G/DL (ref 6.8–8.8)
PSA SERPL-ACNC: 0.73 UG/L (ref 0–4)
RBC # BLD AUTO: 5.09 10E12/L (ref 4.4–5.9)
SODIUM SERPL-SCNC: 142 MMOL/L (ref 133–144)
TRIGL SERPL-MCNC: 144 MG/DL
WBC # BLD AUTO: 6.1 10E9/L (ref 4–11)

## 2019-08-20 PROCEDURE — 85027 COMPLETE CBC AUTOMATED: CPT | Performed by: FAMILY MEDICINE

## 2019-08-20 PROCEDURE — 80053 COMPREHEN METABOLIC PANEL: CPT | Performed by: FAMILY MEDICINE

## 2019-08-20 PROCEDURE — 80061 LIPID PANEL: CPT | Performed by: FAMILY MEDICINE

## 2019-08-20 PROCEDURE — 99396 PREV VISIT EST AGE 40-64: CPT | Performed by: FAMILY MEDICINE

## 2019-08-20 PROCEDURE — 36415 COLL VENOUS BLD VENIPUNCTURE: CPT | Performed by: FAMILY MEDICINE

## 2019-08-20 PROCEDURE — G0103 PSA SCREENING: HCPCS | Performed by: FAMILY MEDICINE

## 2019-08-20 RX ORDER — ATORVASTATIN CALCIUM 20 MG/1
20 TABLET, FILM COATED ORAL DAILY
Qty: 90 TABLET | Refills: 3 | Status: SHIPPED | OUTPATIENT
Start: 2019-08-20 | End: 2020-09-18

## 2019-08-20 RX ORDER — FINASTERIDE 1 MG/1
1 TABLET, FILM COATED ORAL DAILY
Qty: 90 TABLET | Refills: 3 | Status: SHIPPED | OUTPATIENT
Start: 2019-08-20 | End: 2020-09-17

## 2019-08-20 RX ORDER — SILDENAFIL 50 MG/1
50 TABLET, FILM COATED ORAL DAILY PRN
Qty: 30 TABLET | Refills: 11 | Status: SHIPPED | OUTPATIENT
Start: 2019-08-20 | End: 2020-09-17

## 2019-08-20 ASSESSMENT — MIFFLIN-ST. JEOR: SCORE: 1756.85

## 2019-08-20 NOTE — PROGRESS NOTES
SUBJECTIVE:   CC: Sina Stoner is an 54 year old male who presents for preventive health visit.     Healthy Habits:    Do you get at least three servings of calcium containing foods daily (dairy, green leafy vegetables, etc.)? yes    Amount of exercise or daily activities, outside of work: 2 day(s) per week    Problems taking medications regularly No    Medication side effects: No    Have you had an eye exam in the past two years? yes    Do you see a dentist twice per year? yes    Do you have sleep apnea, excessive snoring or daytime drowsiness?no    Hyperlipidemia Follow-Up    Are you having any of the following symptoms? (Select all that apply)  No complaints of shortness of breath, chest pain or pressure.  No increased sweating or nausea with activity.  No left-sided neck or arm pain.  No complaints of pain in calves when walking 1-2 blocks.    Are you regularly taking any medication or supplement to lower your cholesterol?   Yes- rx Atorvastatin 20mg    Are you having muscle aches or other side effects that you think could be caused by your cholesterol lowering medication?  No    Quirino's LDL levels have been trending downwards and has been taking his atorvastatin daily without complications.     Today's PHQ-2 Score: 0-0  PHQ-2 ( 1999 [employer]) 8/20/2019 10/25/2018   Q1: Little interest or pleasure in doing things 0 0   Q2: Feeling down, depressed or hopeless 0 0   PHQ-2 Score 0 0     Abuse: Current or Past(Physical, Sexual or Emotional)- No  Do you feel safe in your environment? Yes    Social History     Tobacco Use     Smoking status: Never Smoker     Smokeless tobacco: Never Used   Substance Use Topics     Alcohol use: Yes     Alcohol/week: 0.0 oz     Comment: 0-2 beers per week     If you drink alcohol do you typically have >3 drinks per day or >7 drinks per week? No                      Last PSA:   PSA   Date Value Ref Range Status   03/10/2015 1.28 0 - 4 ug/L Final       Reviewed orders with patient.  "Reviewed health maintenance and updated orders accordingly - Yes    Reviewed and updated as needed this visit by clinical staff  Tobacco  Allergies  Meds  Med Hx  Surg Hx  Fam Hx  Soc Hx        Reviewed and updated as needed this visit by Provider  Surg Hx        ROS:  Constitutional, HEENT, cardiovascular, pulmonary, GI, , musculoskeletal, neuro, skin, endocrine and psych systems are negative, except as otherwise noted.    Previous cortisone injection was helpful for his left trigger finger.     This document serves as a record of the services and decisions personally performed and made by Sina Herrera MD. It was created on his behalf by Magdy Estrella, a trained medical scribe. The creation of this document is based the provider's statements to the medical scribe.  Scribe Magdy Estrella 9:00 AM, August 20, 2019    OBJECTIVE:   BP 98/58 (BP Location: Right arm, Patient Position: Chair, Cuff Size: Adult Large)   Pulse 59   Temp 98.2  F (36.8  C) (Oral)   Ht 1.791 m (5' 10.5\")   Wt 90.3 kg (199 lb)   SpO2 98%   BMI 28.15 kg/m    EXAM:  GENERAL: healthy, alert, well nourished, well hydrated, no distress  EYES: Eyes grossly normal to inspection, extraocular movements - intact, and PERRL  HENT: ear canals- normal; TMs- normal; Nose- normal; Mouth- no ulcers, no lesions  NECK: no tenderness, no adenopathy, no asymmetry, no masses, no stiffness; thyroid- normal to palpation  RESP: lungs clear to auscultation - no rales, no rhonchi, no wheezes  CV: regular rates and rhythm, normal S1 S2, no S3 or S4 and no murmur, no click or rub -  ABDOMEN: soft, no tenderness, no  hepatosplenomegaly, no masses, normal bowel sounds  MS: extremities- no gross deformities noted, no edema  SKIN: no suspicious lesions, no rashes  BACK: no CVA tenderness, no paralumbar tenderness  - male: testicles- normal, no atrophy, no masses;  no inguinal hernias  RECTAL- male: no masses, no hemorhoids, Prostate- symmetric, no  nodularity, no masses, no " "hypertrophy  PSYCH: Alert and oriented times 3; speech- coherent , normal rate and volume; able to articulate logical thoughts, able to abstract reason, no tangential thoughts, no hallucinations or delusions, affect- normal  LYMPHATICS: ant. cervical- normal, post. cervical- normal, axillary- normal, supraclavicular- normal, inguinal- normal  NEURO: strength and tone- normal, sensory exam- grossly normal, mentation- intact speech- normal, reflexes- symmetric    ASSESSMENT/PLAN:   Sina was seen today for physical.    Diagnoses and all orders for this visit:    Encounter for wellness examination  -     CBC with platelets  -     Prostate spec antigen screen  -     Comprehensive metabolic panel (BMP + Alb, Alk Phos, ALT, AST, Total. Bili, TP)    Hyperlipidemia LDL goal <100 - Improving, recommended diet and exercise,   -     Lipid panel reflex to direct LDL Fasting  -     atorvastatin (LIPITOR) 20 MG tablet; Take 1 tablet (20 mg) by mouth daily  -     Comprehensive metabolic panel (BMP + Alb, Alk Phos, ALT, AST, Total. Bili, TP)    Erectile dysfunction, unspecified erectile dysfunction type - Stable, continue:  -     sildenafil (VIAGRA) 50 MG tablet; Take 1 tablet (50 mg) by mouth daily as needed (erectile dysfunction)    Anxiety - Stable, doing well.    Vitamin D deficiency - Stable, continue supplements    Alopecia - New diagnosis, begin:   -     finasteride (PROPECIA) 1 MG tablet; Take 1 tablet (1 mg) by mouth daily    COUNSELING:  Reviewed preventive health counseling, as reflected in patient instructions       Regular exercise       Healthy diet/nutrition    Estimated body mass index is 28.15 kg/m  as calculated from the following:    Height as of this encounter: 1.791 m (5' 10.5\").    Weight as of this encounter: 90.3 kg (199 lb).  Weight management plan: Discussed healthy diet and exercise guidelines   reports that he has never smoked. He has never used smokeless tobacco.    Counseling Resources:  ATP IV " Guidelines  Pooled Cohorts Equation Calculator  FRAX Risk Assessment  ICSI Preventive Guidelines  Dietary Guidelines for Americans, 2010  USDA's MyPlate  ASA Prophylaxis  Lung CA Screening    The information in this document, created by the medical scribe for me, accurately reflects the services I personally performed and the decisions made by me. I have reviewed and approved this document for accuracy prior to leaving the patient care area.  9:07 AM, 08/20/19    Sina Herrera MD  [facility] [place]

## 2019-08-23 NOTE — RESULT ENCOUNTER NOTE
Dear Quirino,    Here is a summary of your recent test results:  -Normal red blood cell (hgb) levels, normal white blood cell count and normal platelet levels.  -PSA (prostate specific antigen) test is normal.  This indicates a low likelihood of prostate cancer.  ADVISE: rechecking this in 1 year.  -Cholesterol levels are at your goal levels.  ADVISE: continuing your medication, a regular exercise program with at least 150 minutes of aerobic exercise per week, and eating a low saturated fat/low carbohydrate diet.  Also, you should recheck this fasting cholesterol panel in 12 months.  -Liver and gallbladder tests are normal (ALT,AST, Alk phos, bilirubin), kidney function is essentially normal (Cr, GFR), sodium is normal, potassium is normal, calcium is normal, glucose is normal.    For additional lab test information, labtestsonline.org is an excellent reference.    In addition, here is a list of due or overdue Health Maintenance reminders:  Zoster (Shingles) Vaccine(1 of 2) due on 04/21/2015    Please call us at [phone] (or use thrdPlace) to address the above recommendations if needed.           Thank you very much for trusting me and [facility].     Healthy regards,  Alonzo Herrera MD

## 2019-10-01 ENCOUNTER — HEALTH MAINTENANCE LETTER (OUTPATIENT)
Age: 54
End: 2019-10-01

## 2019-12-12 ENCOUNTER — TELEPHONE (OUTPATIENT)
Dept: FAMILY MEDICINE | Facility: CLINIC | Age: 54
End: 2019-12-12

## 2019-12-12 NOTE — TELEPHONE ENCOUNTER
Reason for Call:  Other appointment    Detailed comments: Pt would like an appt this week or next if possible with Sharon.  Pt was seen for this in Aug.  Pt is having left thumb problem  - pain, stiffness and triggers every day    Phone Number Patient can be reached at: Cell number on file:    Telephone Information:   Mobile [phone]       Best Time: anytime    Can we leave a detailed message on this number? YES    Call taken on 12/12/2019 at 10:26 AM by Devora Jesus

## 2019-12-16 ENCOUNTER — TELEPHONE (OUTPATIENT)
Dept: FAMILY MEDICINE | Facility: CLINIC | Age: 54
End: 2019-12-16

## 2019-12-16 DIAGNOSIS — M65.312 TRIGGER FINGER OF LEFT THUMB: Primary | ICD-10-CM

## 2019-12-16 NOTE — TELEPHONE ENCOUNTER
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 Offer Friday the 27th at 3 or 4:20 otherwise he has to wait until the following week. Tuesday has a lot of openings in the afternoon. Katelynn Mcmullen CMA

## 2019-12-16 NOTE — TELEPHONE ENCOUNTER
Instead of waiting or seeing someone else patient would like a referral for an orthopedic.     Sent request to Tre for orders for a referral.,      Nirali Nicole

## 2019-12-16 NOTE — TELEPHONE ENCOUNTER
Patient thinks it would be best to have a referral to an orthopedic to look at his thumb.  Said you have spoken to him before about the need to possibly have surgery so patient thinks it may be time to see an orthopedic.     Can you place orders for a referral and let him know once placed and then patient will try to schedule with an orthopedic.      iNrali Nicole

## 2019-12-18 ENCOUNTER — TELEPHONE (OUTPATIENT)
Dept: ORTHOPEDICS | Facility: CLINIC | Age: 54
End: 2019-12-18

## 2019-12-18 ENCOUNTER — OFFICE VISIT (OUTPATIENT)
Dept: ORTHOPEDICS | Facility: CLINIC | Age: 54
End: 2019-12-18
Payer: COMMERCIAL

## 2019-12-18 ENCOUNTER — TRANSFERRED RECORDS (OUTPATIENT)
Dept: HEALTH INFORMATION MANAGEMENT | Facility: CLINIC | Age: 54
End: 2019-12-18

## 2019-12-18 VITALS
HEIGHT: 70 IN | DIASTOLIC BLOOD PRESSURE: 78 MMHG | WEIGHT: 206.6 LBS | SYSTOLIC BLOOD PRESSURE: 122 MMHG | BODY MASS INDEX: 29.58 KG/M2

## 2019-12-18 DIAGNOSIS — G89.29 CHRONIC HAND PAIN, LEFT: Primary | ICD-10-CM

## 2019-12-18 DIAGNOSIS — M65.312 TRIGGER FINGER OF LEFT THUMB: ICD-10-CM

## 2019-12-18 DIAGNOSIS — M79.642 CHRONIC HAND PAIN, LEFT: Primary | ICD-10-CM

## 2019-12-18 PROCEDURE — 99203 OFFICE O/P NEW LOW 30 MIN: CPT | Performed by: ORTHOPAEDIC SURGERY

## 2019-12-18 ASSESSMENT — MIFFLIN-ST. JEOR: SCORE: 1783.38

## 2019-12-18 NOTE — PROGRESS NOTES
HISTORY OF PRESENT ILLNESS:    Sina Stoner is a 54 year old male who is seen in consultation at the request of Dr. Sina Herrera for left trigger thumb.    Present symptoms: Dull radiating pain, decreased strength, locking and radicular symptoms following sleep. Symptoms improve with movement throughout the day.   No specific trauma to that hand.  He does a lot of typing and house projects that might be the source of irritation to the thumb.  Is mainly concerned about losing  strength.  He has noted intermittent numbness in the morning but he does not typically wake up from paresthesia or pain during the night.  He is right-hand dominant.  Treatments tried to this point: 08/03/19 - cortisone injection, 3 months of symptom relief  Orthopedic PMH: 01/19/16 - R trigger thumb, treated with cortisone injection and PT     Past Medical History:   Diagnosis Date     Depressive disorder, not elsewhere classified      Supraspinatus tendonitis 4/1/2011       Past Surgical History:   Procedure Laterality Date     COLONOSCOPY N/A 3/27/2015    Procedure: COLONOSCOPY;  Surgeon: Brien Lorenzana MD;  Location: [facility]     HAND SURGERY Right 1984    right 5th metacarpal repair     HC REMOVAL OF TONSILS,<13 Y/O      Tonsils <12y.o.       Family History   Problem Relation Age of Onset     Coronary Artery Disease Father         MI at 53 yo     Eye Disorder Mother      Psychotic Disorder Mother         dementia      Gastrointestinal Disease Mother      Alzheimer Disease Mother      Arthritis Mother      Osteoporosis Mother      No Known Problems Brother      No Known Problems Daughter      No Known Problems Daughter      Alzheimer Disease Maternal Grandmother      Cancer Maternal Grandfather      Hypertension Brother      Prostate Cancer No family hx of      Colon Cancer No family hx of        Social History     Socioeconomic History     Marital status:      Spouse name: Devora     Number of children: 2     Years of  education: Not on file     Highest education level: Not on file   Occupational History     Occupation: [employer]     Employer: [employer]   Social Needs     Financial resource strain: Not on file     Food insecurity:     Worry: Not on file     Inability: Not on file     Transportation needs:     Medical: Not on file     Non-medical: Not on file   Tobacco Use     Smoking status: Never Smoker     Smokeless tobacco: Never Used   Substance and Sexual Activity     Alcohol use: Yes     Alcohol/week: 0.0 standard drinks     Comment: 0-2 beers per week     Drug use: No     Sexual activity: Yes     Partners: Female     Birth control/protection: Male Surgical   Lifestyle     Physical activity:     Days per week: Not on file     Minutes per session: Not on file     Stress: Not on file   Relationships     Social connections:     Talks on phone: Not on file     Gets together: Not on file     Attends Nondenominational service: Not on file     Active member of club or organization: Not on file     Attends meetings of clubs or organizations: Not on file     Relationship status: Not on file     Intimate partner violence:     Fear of current or ex partner: Not on file     Emotionally abused: Not on file     Physically abused: Not on file     Forced sexual activity: Not on file   Other Topics Concern      Service Not Asked     Blood Transfusions No     Caffeine Concern Yes     Comment: 3-4 serv/day     Occupational Exposure Not Asked     Hobby Hazards Not Asked     Sleep Concern No     Stress Concern Not Asked     Weight Concern Not Asked     Special Diet Not Asked     Back Care Not Asked     Exercise Yes     Comment: walks regularily     Bike Helmet Not Asked     Seat Belt Yes     Self-Exams Not Asked     Parent/sibling w/ CABG, MI or angioplasty before 65F 55M? Yes     Comment: father   Social History Narrative     Not on file       Current Outpatient Medications   Medication Sig Dispense Refill     atorvastatin  (LIPITOR) 20 MG tablet Take 1 tablet (20 mg) by mouth daily 90 tablet 3     Cholecalciferol (VITAMIN D) 1000 UNIT capsule Take 1 capsule by mouth daily.       finasteride (PROPECIA) 1 MG tablet Take 1 tablet (1 mg) by mouth daily 90 tablet 3     Multiple Vitamin (MULTIVITAMINS PO) Take  by mouth daily.       sildenafil (VIAGRA) 50 MG tablet Take 1 tablet (50 mg) by mouth daily as needed (erectile dysfunction) 30 tablet 11       No Known Allergies    REVIEW OF SYSTEMS:  CONSTITUTIONAL:  NEGATIVE for fever, chills, change in weight  INTEGUMENTARY/SKIN:  NEGATIVE for worrisome rashes, moles or lesions  EYES:  NEGATIVE for vision changes or irritation  ENT/MOUTH:  NEGATIVE for ear, mouth and throat problems  RESP:  NEGATIVE for significant cough or SOB  BREAST:  NEGATIVE for masses, tenderness or discharge  CV:  NEGATIVE for chest pain, palpitations or peripheral edema  GI:  NEGATIVE for nausea, abdominal pain, heartburn, or change in bowel habits  :  Negative   MUSCULOSKELETAL:  See HPI above  NEURO:  NEGATIVE for weakness, dizziness or paresthesias  ENDOCRINE:  NEGATIVE for temperature intolerance, skin/hair changes  HEME/ALLERGY/IMMUNE:  NEGATIVE for bleeding problems  PSYCHIATRIC:  NEGATIVE for changes in mood or affect      PHYSICAL EXAM:  There were no vitals taken for this visit.  There is no height or weight on file to calculate BMI.   GENERAL APPEARANCE: healthy, alert and no distress   HEENT: No apparent thyroid megaly. Clear sclera with normal ocular movement  RESPIRATORY: No labored breathing  SKIN: no suspicious lesions or rashes  NEURO: Normal strength and tone, mentation intact and speech normal  VASCULAR: Good pulses, and capillary refill   LYMPH: no lymphadenopathy   PSYCH:  mentation appears normal and affect normal/bright    MUSCULOSKELETAL:  Normal gait  Symmetrical appearing hands  Palpable pain at the A1 pulley with obvious mild catching  No locking is noted with a thumb movement  Minimal  tenderness at the thumb A1 pulley of the right side as well  But the right thumb does not have any catching  Gross sensation is intact  No thenar eminence atrophy noted  Grossly intact  strength  No swelling of the fingers       ASSESSMENT:  Chronic left trigger thumb  By history, early stage of carpal tunnel syndrome most likely    PLAN:  We had a long discussion as to trigger finger phenomenon.  Pathophysiology of trigger finger was explained.  Treatment options were discussed.  With only minimal benefit from recent cortisone injection and because of previous history of having the same problem in the past, at this point, surgical intervention of trigger thumb release was felt to be a very reasonable option.  We had a long discussion as to the details of the surgery and the recovery time and potential risks.  Choices for anesthesia were informed.  He feels comfortable doing the surgery under local anesthesia.  All the questions were answered.  He wants to proceed with the procedure as soon as possible..    Imaging Interpretation: None taken today      Unruly Sheriff MD  Department of Orthopedic Surgery        Disclaimer: This note consists of symbols derived from keyboarding, dictation and/or voice recognition software. As a result, there may be errors in the script that have gone undetected. Please consider this when interpreting information found in this chart.

## 2019-12-18 NOTE — TELEPHONE ENCOUNTER
Scheduled surgery    Type of surgery: left thumb trigger finger  Location of surgery: Other: [facility]  Date and time of surgery: 12/18/19 @ 3pm   Surgeon: Sharita   Pre-Op Appt Date: none, local anesthesia  Post-Op Appt Date: 12/26/19   Packet sent out: Yes  Pre-cert/Authorization completed:  No  Date: 12/18/19      Chelo Duncan, Surgery Scheduler

## 2019-12-18 NOTE — LETTER
12/18/2019         RE: Sina Stoner  [address]  [place] [place] [address]        Dear Colleague,    Thank you for referring your patient, Sina Stoner, to the [facility] ORTHOPEDIC SURGERY. Please see a copy of my visit note below.    HISTORY OF PRESENT ILLNESS:    Sina tSoner is a 54 year old male who is seen in consultation at the request of Dr. Sina Herrera for left trigger thumb.    Present symptoms: Dull radiating pain, decreased strength, locking and radicular symptoms following sleep. Symptoms improve with movement throughout the day.   No specific trauma to that hand.  He does a lot of typing and house projects that might be the source of irritation to the thumb.  Is mainly concerned about losing  strength.  He has noted intermittent numbness in the morning but he does not typically wake up from paresthesia or pain during the night.  He is right-hand dominant.  Treatments tried to this point: 08/03/19 - cortisone injection, 3 months of symptom relief  Orthopedic PMH: 01/19/16 - R trigger thumb, treated with cortisone injection and PT     Past Medical History:   Diagnosis Date     Depressive disorder, not elsewhere classified      Supraspinatus tendonitis 4/1/2011       Past Surgical History:   Procedure Laterality Date     COLONOSCOPY N/A 3/27/2015    Procedure: COLONOSCOPY;  Surgeon: Brien Lorenznaa MD;  Location:  GI     HAND SURGERY Right 1984    right 5th metacarpal repair     HC REMOVAL OF TONSILS,<11 Y/O      Tonsils <12y.o.       Family History   Problem Relation Age of Onset     Coronary Artery Disease Father         MI at 51 yo     Eye Disorder Mother      Psychotic Disorder Mother         dementia      Gastrointestinal Disease Mother      Alzheimer Disease Mother      Arthritis Mother      Osteoporosis Mother      No Known Problems Brother      No Known Problems Daughter      No Known Problems Daughter      Alzheimer Disease Maternal Grandmother      Cancer  Maternal Grandfather      Hypertension Brother      Prostate Cancer No family hx of      Colon Cancer No family hx of        Social History     Socioeconomic History     Marital status:      Spouse name: Devora     Number of children: 2     Years of education: Not on file     Highest education level: Not on file   Occupational History     Occupation: [employer]     Employer: [employer]   Social Needs     Financial resource strain: Not on file     Food insecurity:     Worry: Not on file     Inability: Not on file     Transportation needs:     Medical: Not on file     Non-medical: Not on file   Tobacco Use     Smoking status: Never Smoker     Smokeless tobacco: Never Used   Substance and Sexual Activity     Alcohol use: Yes     Alcohol/week: 0.0 standard drinks     Comment: 0-2 beers per week     Drug use: No     Sexual activity: Yes     Partners: Female     Birth control/protection: Male Surgical   Lifestyle     Physical activity:     Days per week: Not on file     Minutes per session: Not on file     Stress: Not on file   Relationships     Social connections:     Talks on phone: Not on file     Gets together: Not on file     Attends Jain service: Not on file     Active member of club or organization: Not on file     Attends meetings of clubs or organizations: Not on file     Relationship status: Not on file     Intimate partner violence:     Fear of current or ex partner: Not on file     Emotionally abused: Not on file     Physically abused: Not on file     Forced sexual activity: Not on file   Other Topics Concern      Service Not Asked     Blood Transfusions No     Caffeine Concern Yes     Comment: 3-4 serv/day     Occupational Exposure Not Asked     Hobby Hazards Not Asked     Sleep Concern No     Stress Concern Not Asked     Weight Concern Not Asked     Special Diet Not Asked     Back Care Not Asked     Exercise Yes     Comment: walks regularily     Bike Helmet Not Asked     Seat  Belt Yes     Self-Exams Not Asked     Parent/sibling w/ CABG, MI or angioplasty before 65F 55M? Yes     Comment: father   Social History Narrative     Not on file       Current Outpatient Medications   Medication Sig Dispense Refill     atorvastatin (LIPITOR) 20 MG tablet Take 1 tablet (20 mg) by mouth daily 90 tablet 3     Cholecalciferol (VITAMIN D) 1000 UNIT capsule Take 1 capsule by mouth daily.       finasteride (PROPECIA) 1 MG tablet Take 1 tablet (1 mg) by mouth daily 90 tablet 3     Multiple Vitamin (MULTIVITAMINS PO) Take  by mouth daily.       sildenafil (VIAGRA) 50 MG tablet Take 1 tablet (50 mg) by mouth daily as needed (erectile dysfunction) 30 tablet 11       No Known Allergies    REVIEW OF SYSTEMS:  CONSTITUTIONAL:  NEGATIVE for fever, chills, change in weight  INTEGUMENTARY/SKIN:  NEGATIVE for worrisome rashes, moles or lesions  EYES:  NEGATIVE for vision changes or irritation  ENT/MOUTH:  NEGATIVE for ear, mouth and throat problems  RESP:  NEGATIVE for significant cough or SOB  BREAST:  NEGATIVE for masses, tenderness or discharge  CV:  NEGATIVE for chest pain, palpitations or peripheral edema  GI:  NEGATIVE for nausea, abdominal pain, heartburn, or change in bowel habits  :  Negative   MUSCULOSKELETAL:  See HPI above  NEURO:  NEGATIVE for weakness, dizziness or paresthesias  ENDOCRINE:  NEGATIVE for temperature intolerance, skin/hair changes  HEME/ALLERGY/IMMUNE:  NEGATIVE for bleeding problems  PSYCHIATRIC:  NEGATIVE for changes in mood or affect      PHYSICAL EXAM:  There were no vitals taken for this visit.  There is no height or weight on file to calculate BMI.   GENERAL APPEARANCE: healthy, alert and no distress   HEENT: No apparent thyroid megaly. Clear sclera with normal ocular movement  RESPIRATORY: No labored breathing  SKIN: no suspicious lesions or rashes  NEURO: Normal strength and tone, mentation intact and speech normal  VASCULAR: Good pulses, and capillary refill   LYMPH: no  lymphadenopathy   PSYCH:  mentation appears normal and affect normal/bright    MUSCULOSKELETAL:  Normal gait  Symmetrical appearing hands  Palpable pain at the A1 pulley with obvious mild catching  No locking is noted with a thumb movement  Minimal tenderness at the thumb A1 pulley of the right side as well  But the right thumb does not have any catching  Gross sensation is intact  No thenar eminence atrophy noted  Grossly intact  strength  No swelling of the fingers       ASSESSMENT:  Chronic left trigger thumb  By history, early stage of carpal tunnel syndrome most likely    PLAN:  We had a long discussion as to trigger finger phenomenon.  Pathophysiology of trigger finger was explained.  Treatment options were discussed.  With only minimal benefit from recent cortisone injection and because of previous history of having the same problem in the past, at this point, surgical intervention of trigger thumb release was felt to be a very reasonable option.  We had a long discussion as to the details of the surgery and the recovery time and potential risks.  Choices for anesthesia were informed.  He feels comfortable doing the surgery under local anesthesia.  All the questions were answered.  He wants to proceed with the procedure as soon as possible..    Imaging Interpretation: None taken today      Unruly Sheriff MD  Department of Orthopedic Surgery        Disclaimer: This note consists of symbols derived from keyboarding, dictation and/or voice recognition software. As a result, there may be errors in the script that have gone undetected. Please consider this when interpreting information found in this chart.    Again, thank you for allowing me to participate in the care of your patient.        Sincerely,        Unruly Sheriff MD

## 2019-12-26 ENCOUNTER — OFFICE VISIT (OUTPATIENT)
Dept: ORTHOPEDICS | Facility: CLINIC | Age: 54
End: 2019-12-26
Payer: COMMERCIAL

## 2019-12-26 DIAGNOSIS — Z47.89 ORTHOPEDIC AFTERCARE: Primary | ICD-10-CM

## 2019-12-26 PROCEDURE — 99024 POSTOP FOLLOW-UP VISIT: CPT | Performed by: PHYSICIAN ASSISTANT

## 2019-12-26 NOTE — PROGRESS NOTES
HISTORY OF PRESENT ILLNESS:    Sina Stoner is a 54 year old male who is seen in follow up for Left thumb DOS 12/18/2019, Dr. Sheriff - A1 inez release.  Present symptoms: Pt reports full improvement.  no triggering.  Is  keeping covered. Is  using hand for activities. No new complaints.  Denies Chest pain, Calve pain, Fever, Chills.    Current Treatment: Postop.    PHYSICAL EXAM:  There were no vitals taken for this visit.  There is no weight on file to calculate BMI.   GENERAL APPEARANCE: healthy, alert and no distress   PSYCH: mentation appears normal and affect normal/bright    MSK:  Left: thumb .  Incision clean and dry, Sutures present, healing.  no incisional erythema.   No Ecchymosis.  Edema min at thumb digits.  CMS: trini incisional numbness, otherwise grossly intact to digits.  AROM: mild restriction in thumb flexion, otherwise WNL with no triggering.      ASSESSMENT:  Sina Stoner is a 54 year old male  S/P thumb right, A1 pulley release.  Healing.    PLAN:  - Surgery discussed, images reviewed if applicable, and all questions were answered at this time.  - Sutures removed with sterile technique, steri-strips applied in usual fashion, care instructions given and verbally acknowledged.  - Medications: OTC PRN.  - Physical Therapy: As instructed/ RICE and PROM.  - AAT    Return to clinic PRN.    Ravindra Saldana PA-C    Dept. Orthopedic Surgery  [facility]     12/26/2019

## 2019-12-26 NOTE — PATIENT INSTRUCTIONS
Incision Care:  Sutures were removed and Steri-Strips applied in usual fashion.  Keep dry 24-48 hours.  Showering ok after that time, however no soaking or scrubbing of incision for 1 weeks.  Steri-strips will most likely fall off on their own, however they may be removed after 1 weeks with rubbing alcohol if they have not.    If draining or bleeding stops the tape-strips are enough coverage unless you were instructed otherwise or you would like to cover for comfort.  If drainage or bleeding continues please cover with clean dressings.     Gradually increase your activities as you can tolerated them, starting at a level well below what you would normally do.     Scar tissue may develop and be present at or deep to the incision site for several weeks to months which may feel like a lump. Gentle massage to the area when tolerated may help reduce this.   Also the top layers of skin may peel away over the next weeks.    Follow up as needed in clinic.

## 2020-01-13 ENCOUNTER — OFFICE VISIT (OUTPATIENT)
Dept: FAMILY MEDICINE | Facility: CLINIC | Age: 55
End: 2020-01-13
Payer: COMMERCIAL

## 2020-01-13 VITALS
WEIGHT: 204 LBS | TEMPERATURE: 99.1 F | HEIGHT: 70 IN | OXYGEN SATURATION: 98 % | DIASTOLIC BLOOD PRESSURE: 63 MMHG | HEART RATE: 94 BPM | BODY MASS INDEX: 29.2 KG/M2 | SYSTOLIC BLOOD PRESSURE: 102 MMHG

## 2020-01-13 DIAGNOSIS — J10.1 INFLUENZA A: ICD-10-CM

## 2020-01-13 DIAGNOSIS — R05.9 COUGH: Primary | ICD-10-CM

## 2020-01-13 DIAGNOSIS — R50.9 FEVER, UNSPECIFIED FEVER CAUSE: ICD-10-CM

## 2020-01-13 LAB
FLUAV+FLUBV AG SPEC QL: NEGATIVE
FLUAV+FLUBV AG SPEC QL: POSITIVE
SPECIMEN SOURCE: ABNORMAL

## 2020-01-13 PROCEDURE — 87804 INFLUENZA ASSAY W/OPTIC: CPT | Performed by: NURSE PRACTITIONER

## 2020-01-13 PROCEDURE — 99213 OFFICE O/P EST LOW 20 MIN: CPT | Performed by: NURSE PRACTITIONER

## 2020-01-13 RX ORDER — BENZONATATE 100 MG/1
100-200 CAPSULE ORAL 3 TIMES DAILY PRN
Qty: 30 CAPSULE | Refills: 0 | Status: SHIPPED | OUTPATIENT
Start: 2020-01-13 | End: 2020-11-20

## 2020-01-13 RX ORDER — ALBUTEROL SULFATE 90 UG/1
2 AEROSOL, METERED RESPIRATORY (INHALATION) EVERY 4 HOURS PRN
Qty: 18 G | Refills: 0 | Status: SHIPPED | OUTPATIENT
Start: 2020-01-13 | End: 2022-02-11

## 2020-01-13 RX ORDER — OSELTAMIVIR PHOSPHATE 75 MG/1
75 CAPSULE ORAL 2 TIMES DAILY
Qty: 10 CAPSULE | Refills: 0 | Status: SHIPPED | OUTPATIENT
Start: 2020-01-13 | End: 2020-01-18

## 2020-01-13 ASSESSMENT — MIFFLIN-ST. JEOR: SCORE: 1771.59

## 2020-01-13 NOTE — PROGRESS NOTES
"Subjective   Sina Stoner is a 54 year old male who presents to clinic today for the following health issues:    HPI   Acute Illness   Acute illness concerns: cough, fever   Onset: yesterday morning    Fever: YES- felt warm - curr 99.1-O    Chills/Sweats: YES- both    Headache (location?): no    Sinus Pressure:no    Conjunctivitis:  no    Ear Pain: YES - pressure    Rhinorrhea: YES- minor - clear    Congestion: YES- chest    Sore Throat: YES- mild - off and on     Cough: YES-non-productive    Wheeze: no    Decreased Appetite: YES- since yesterday    Nausea: no    Vomiting: no    Diarrhea:  no    Dysuria/Freq.: no    Fatigue/Achiness: YES- both    Sick/Strep Exposure: YES- large crowds[place]     Therapies Tried and outcome: Dayquil, nyquiil, ibuprofen - last dose 9am - moderate relief      Has not had flu vaccine this year. [place]aving for [place] Saturday.     Reviewed and updated as needed this visit by provider:  Tobacco  Allergies  Meds  Problems  Med Hx  Surg Hx  Fam Hx       Review of Systems   Constitutional, HEENT, cardiovascular, pulmonary, GI, , musculoskeletal, neuro, skin, endocrine and psych systems are negative, except as otherwise noted in the HPI.       Objective   /63 (BP Location: Left arm, Patient Position: Chair, Cuff Size: Adult Large)   Pulse 94   Temp 99.1  F (37.3  C) (Oral)   Ht 1.778 m (5' 10\")   Wt 92.5 kg (204 lb)   SpO2 98%   BMI 29.27 kg/m   Body mass index is 29.27 kg/m .  Physical Exam   GENERAL: healthy, alert, well nourished, well hydrated, no distress  EYES: Eyes grossly normal to inspection, extraocular movements - intact, and PERRL  HENT: ear canals- normal; TMs- normal; Nose- normal; Mouth- no ulcers, no lesions; absent tonsils  NECK: no tenderness, no adenopathy, no asymmetry, no masses, no stiffness; thyroid- normal to palpation  RESP: lungs clear to auscultation - no rales, no rhonchi, no wheezes, hacking cough  CV: regular rates and rhythm, normal S1 S2, " no S3 or S4 and no murmur, no click or rub -  ABDOMEN: soft, no tenderness, no  hepatosplenomegaly, no masses, normal bowel sounds  SKIN: no suspicious lesions, no rashes  LYMPHATICS: ant. cervical- normal, post. cervical- normal, axillary- normal, supraclavicular- normal, inguinal- normal    Diagnostic Test Results  Influenza A positive.       Assessment & Plan   Sina was seen today for cough and fever.    Diagnoses and all orders for this visit:    Cough  Fever, unspecified fever cause  Influenza A  Reassuring exam.   Positive influenza A  Influenza education provided.   Discussed risks versus benefits of Tamiflu; patient would like to proceed with Tamiflu.   OTC medications discussed as well as tessalon and inhaler as needed.   -     Influenza A/B antigen  -     albuterol (PROAIR HFA/PROVENTIL HFA/VENTOLIN HFA) 108 (90 Base) MCG/ACT inhaler; Inhale 2 puffs into the lungs every 4 hours as needed for shortness of breath / dyspnea or wheezing  -     benzonatate (TESSALON) 100 MG capsule; Take 1-2 capsules (100-200 mg) by mouth 3 times daily as needed for cough  -     oseltamivir (TAMIFLU) 75 MG capsule; Take 1 capsule (75 mg) by mouth 2 times daily for 5 days    See Patient Instructions    Return if symptoms worsen or fail to improve.     Ivette Brown, RYAN-BC     [address] [address]  [email or web address]   Office: [phone]

## 2020-01-13 NOTE — PATIENT INSTRUCTIONS
Patient Education     Influenza (Adult)    Influenza is also called the flu. It is a viral illness that affects the air passages of your lungs. It is different from the common cold. The flu can easily be passed from one to person to another. It may be spread through the air by coughing and sneezing. Or it can be spread by touching the sick person and then touching your own eyes, nose, or mouth.  The flu starts 1 to 3 days after you are exposed to the flu virus. It may last for 1 to 2 weeks but many people feel tired or fatigued for many weeks afterward. You usually don t need to take antibiotics unless you have a complication. This might be an ear or sinus infection or pneumonia.  Symptoms of the flu may be mild or severe. They can include extreme tiredness (wanting to stay in bed all day), chills, fevers, muscle aches, soreness with eye movement, headache, and a dry, hacking cough.  Home care  Follow these guidelines when caring for yourself at home:    Avoid being around cigarette smoke, whether yours or other people s.    Acetaminophen or ibuprofen will help ease your fever, muscle aches, and headache. Don t give aspirin to anyone younger than 18 who has the flu. Aspirin can harm the liver.    Nausea and loss of appetite are common with the flu. Eat light meals. Drink 6 to 8 glasses of liquids every day. Good choices are water, sport drinks, soft drinks without caffeine, juices, tea, and soup. Extra fluids will also help loosen secretions in your nose and lungs.    Over-the-counter cold medicines will not make the flu go away faster. But the medicines may help with coughing, sore throat, and congestion in your nose and sinuses. Don t use a decongestant if you have high blood pressure.    Stay home until your fever has been gone for at least 24 hours without using medicine to reduce fever.  Follow-up care  Follow up with your healthcare provider, or as advised, if you are not getting better over the next  week.  If you are age 65 or older, talk with your provider about getting a pneumococcal vaccine every 5 years. You should also get this vaccine if you have chronic asthma or COPD. All adults should get a flu vaccine every fall. Ask your provider about this.  When to seek medical advice  Call your healthcare provider right away if any of these occur:    Cough with lots of colored mucus (sputum) or blood in your mucus    Chest pain, shortness of breath, wheezing, or trouble breathing    Severe headache, or face, neck, or ear pain    New rash with fever    Fever of 100.4 F (38 C) or higher, or as directed by your healthcare provider    Confusion, behavior change, or seizure    Severe weakness or dizziness    You get a new fever or cough after getting better for a few days  Date Last Reviewed: 1/1/2017 2000-2019 [employer]. [address] [place] [address], [place] [address]. All rights reserved. This information is not intended as a substitute for professional medical care. Always follow your healthcare professional's instructions.    Plain Mucinex 600-1200 mg twice day. Can add some sudafed (behind the counter).

## 2020-09-18 DIAGNOSIS — E78.5 HYPERLIPIDEMIA LDL GOAL <100: ICD-10-CM

## 2020-09-18 RX ORDER — ATORVASTATIN CALCIUM 20 MG/1
20 TABLET, FILM COATED ORAL DAILY
Qty: 90 TABLET | Refills: 0 | Status: SHIPPED | OUTPATIENT
Start: 2020-09-18 | End: 2020-11-20

## 2020-11-19 NOTE — PROGRESS NOTES
SUBJECTIVE:   CC: Sina Stoner is an 55 year old male who presents for preventive health visit.     HPI      Healthy Habits:    Do you get at least three servings of calcium containing foods daily (dairy, green leafy vegetables, etc.)? yes    Amount of exercise or daily activities, outside of work: not alot    Problems taking medications regularly No    Medication side effects: No    Have you had an eye exam in the past two years? yes    Do you see a dentist twice per year? yes    Do you have sleep apnea, excessive snoring or daytime drowsiness?wakes up to use bathroom    Hyperlipidemia Follow-Up      Are you regularly taking any medication or supplement to lower your cholesterol?   Yes- Lipitor    Are you having muscle aches or other side effects that you think could be caused by your cholesterol lowering medication?  No    Today's PHQ-2 Score:   PHQ-2 ( 1999 Pfizer) 11/20/2020 8/20/2019   Q1: Little interest or pleasure in doing things 0 0   Q2: Feeling down, depressed or hopeless 0 0   PHQ-2 Score 0 0       Abuse: Current or Past(Physical, Sexual or Emotional)- No  Do you feel safe in your environment? Yes    Have you ever done Advance Care Planning? (For example, a Health Directive, POLST, or a discussion with a medical provider or your loved ones about your wishes): declined    Social History     Tobacco Use     Smoking status: Never Smoker     Smokeless tobacco: Never Used   Substance Use Topics     Alcohol use: Yes     Alcohol/week: 0.0 standard drinks     Comment: 0-2 beers per week     If you drink alcohol do you typically have >3 drinks per day or >7 drinks per week? No                      Last PSA:   PSA   Date Value Ref Range Status   11/20/2020 0.77 0 - 4 ug/L Final     Comment:     Assay Method:  Chemiluminescence using Siemens Vista analyzer       Reviewed orders with patient. Reviewed health maintenance and updated orders accordingly - Yes    Reviewed and updated as needed this visit by clinical  "staff  Tobacco  Allergies  Meds  Problems  Med Hx  Surg Hx  Fam Hx  Soc Hx          Reviewed and updated as needed this visit by Provider  Tobacco  Allergies  Meds  Problems  Med Hx  Surg Hx  Fam Hx           ROS:  Constitutional, HEENT, cardiovascular, pulmonary, GI, , musculoskeletal, neuro, skin, endocrine and psych systems are negative, except as otherwise noted.  OBJECTIVE:   /80   Pulse 102   Temp 97  F (36.1  C) (Tympanic)   Ht 1.778 m (5' 10\")   Wt 97.5 kg (215 lb)   SpO2 98%   BMI 30.85 kg/m    EXAM:  GENERAL: healthy, alert and no distress  EYES: Eyes grossly normal to inspection, PERRL and conjunctivae and sclerae normal  HENT: ear canals and TM's normal, nose and mouth without ulcers or lesions  NECK: no adenopathy, no asymmetry, masses, or scars and thyroid normal to palpation  RESP: lungs clear to auscultation - no rales, rhonchi or wheezes  BREAST: normal without masses, tenderness or nipple discharge and no palpable axillary masses or adenopathy  CV: regular rate and rhythm, normal S1 S2, no S3 or S4, no murmur, click or rub, no peripheral edema and peripheral pulses strong  ABDOMEN: soft, nontender, no hepatosplenomegaly, no masses and bowel sounds normal   (male): normal male genitalia without lesions or urethral discharge, no hernia  MS: no gross musculoskeletal defects noted, no edema  SKIN: no suspicious lesions or rashes  NEURO: Normal strength and tone, mentation intact and speech normal  PSYCH: mentation appears normal, affect normal/bright  LYMPH: no cervical, supraclavicular, axillary, or inguinal adenopathy  RECTAL: normal sphincter tone, no rectal masses, prostate normal size, smooth, nontender without nodules or masses      ASSESSMENT/PLAN:   Routine general medical examination at a health care facility      Screening for prostate cancer  - PROSTATE SPEC ANTIGEN SCREEN    Hyperlipidemia LDL goal <100  Controlled - continue medication.  - Lipid panel reflex " "to direct LDL Fasting  - atorvastatin (LIPITOR) 20 MG tablet  Dispense: 90 tablet; Refill: 3  - Comprehensive metabolic panel (BMP + Alb, Alk Phos, ALT, AST, Total. Bili, TP)    Alopecia  Stable - continue medication.  - finasteride (PROPECIA) 1 MG tablet  Dispense: 90 tablet; Refill: 3    Erectile dysfunction, unspecified erectile dysfunction type  Controlled - continue medication.  - sildenafil (VIAGRA) 50 MG tablet  Dispense: 30 tablet; Refill: 5    Screening, deficiency anemia, iron  - CBC with platelets    Encounter for immunization  - C RIV4 (FLUBLOK) VACCINE RECOMBINANT DNA PRSRV ANTIBIO FREE, IM [71024]      COUNSELING:  Reviewed preventive health counseling, as reflected in patient instructions    Estimated body mass index is 30.85 kg/m  as calculated from the following:    Height as of this encounter: 1.778 m (5' 10\").    Weight as of this encounter: 97.5 kg (215 lb).  Weight management plan: Discussed healthy diet and exercise guidelines     reports that he has never smoked. He has never used smokeless tobacco.      Return in about 1 year (around 11/20/2021) for wellness exam with fasting labs, in person, with Dr Alonzo Herrera.           Alonzo Herrera MD     [address] [address]  [email or web address]   Office: [phone]  Pager: [phone]     "

## 2020-11-20 ENCOUNTER — OFFICE VISIT (OUTPATIENT)
Dept: FAMILY MEDICINE | Facility: CLINIC | Age: 55
End: 2020-11-20
Payer: COMMERCIAL

## 2020-11-20 VITALS
HEIGHT: 70 IN | HEART RATE: 102 BPM | OXYGEN SATURATION: 98 % | BODY MASS INDEX: 30.78 KG/M2 | DIASTOLIC BLOOD PRESSURE: 80 MMHG | WEIGHT: 215 LBS | TEMPERATURE: 97 F | SYSTOLIC BLOOD PRESSURE: 110 MMHG

## 2020-11-20 DIAGNOSIS — E78.5 HYPERLIPIDEMIA LDL GOAL <100: ICD-10-CM

## 2020-11-20 DIAGNOSIS — Z13.0 SCREENING, DEFICIENCY ANEMIA, IRON: ICD-10-CM

## 2020-11-20 DIAGNOSIS — Z23 ENCOUNTER FOR IMMUNIZATION: ICD-10-CM

## 2020-11-20 DIAGNOSIS — Z12.5 SCREENING FOR PROSTATE CANCER: ICD-10-CM

## 2020-11-20 DIAGNOSIS — N52.9 ERECTILE DYSFUNCTION, UNSPECIFIED ERECTILE DYSFUNCTION TYPE: ICD-10-CM

## 2020-11-20 DIAGNOSIS — L65.9 ALOPECIA: ICD-10-CM

## 2020-11-20 DIAGNOSIS — Z00.00 ROUTINE GENERAL MEDICAL EXAMINATION AT A HEALTH CARE FACILITY: Primary | ICD-10-CM

## 2020-11-20 PROBLEM — M65.312 TRIGGER FINGER OF LEFT THUMB: Status: RESOLVED | Noted: 2018-10-25 | Resolved: 2020-11-20

## 2020-11-20 LAB
ERYTHROCYTE [DISTWIDTH] IN BLOOD BY AUTOMATED COUNT: 12.3 % (ref 10–15)
HCT VFR BLD AUTO: 49.5 % (ref 40–53)
HGB BLD-MCNC: 16.3 G/DL (ref 13.3–17.7)
MCH RBC QN AUTO: 29.6 PG (ref 26.5–33)
MCHC RBC AUTO-ENTMCNC: 32.9 G/DL (ref 31.5–36.5)
MCV RBC AUTO: 90 FL (ref 78–100)
PLATELET # BLD AUTO: 205 10E9/L (ref 150–450)
RBC # BLD AUTO: 5.5 10E12/L (ref 4.4–5.9)
WBC # BLD AUTO: 7.6 10E9/L (ref 4–11)

## 2020-11-20 PROCEDURE — 90471 IMMUNIZATION ADMIN: CPT | Performed by: FAMILY MEDICINE

## 2020-11-20 PROCEDURE — 80053 COMPREHEN METABOLIC PANEL: CPT | Performed by: FAMILY MEDICINE

## 2020-11-20 PROCEDURE — 80061 LIPID PANEL: CPT | Performed by: FAMILY MEDICINE

## 2020-11-20 PROCEDURE — 36415 COLL VENOUS BLD VENIPUNCTURE: CPT | Performed by: FAMILY MEDICINE

## 2020-11-20 PROCEDURE — 85027 COMPLETE CBC AUTOMATED: CPT | Performed by: FAMILY MEDICINE

## 2020-11-20 PROCEDURE — 90682 RIV4 VACC RECOMBINANT DNA IM: CPT | Performed by: FAMILY MEDICINE

## 2020-11-20 PROCEDURE — G0103 PSA SCREENING: HCPCS | Performed by: FAMILY MEDICINE

## 2020-11-20 PROCEDURE — 99396 PREV VISIT EST AGE 40-64: CPT | Mod: 25 | Performed by: FAMILY MEDICINE

## 2020-11-20 RX ORDER — SILDENAFIL 50 MG/1
50 TABLET, FILM COATED ORAL DAILY PRN
Qty: 30 TABLET | Refills: 5 | Status: SHIPPED | OUTPATIENT
Start: 2020-11-20 | End: 2022-02-11

## 2020-11-20 RX ORDER — ALBUTEROL SULFATE 90 UG/1
2 AEROSOL, METERED RESPIRATORY (INHALATION) EVERY 4 HOURS PRN
Qty: 18 G | Refills: 0 | Status: CANCELLED | OUTPATIENT
Start: 2020-11-20

## 2020-11-20 RX ORDER — ATORVASTATIN CALCIUM 20 MG/1
20 TABLET, FILM COATED ORAL DAILY
Qty: 90 TABLET | Refills: 3 | Status: SHIPPED | OUTPATIENT
Start: 2020-11-20 | End: 2021-12-15

## 2020-11-20 RX ORDER — FINASTERIDE 1 MG/1
1 TABLET, FILM COATED ORAL DAILY
Qty: 90 TABLET | Refills: 3 | Status: SHIPPED | OUTPATIENT
Start: 2020-11-20 | End: 2022-02-11

## 2020-11-20 ASSESSMENT — MIFFLIN-ST. JEOR: SCORE: 1816.48

## 2020-11-21 LAB
ALBUMIN SERPL-MCNC: 4.3 G/DL (ref 3.4–5)
ALP SERPL-CCNC: 125 U/L (ref 40–150)
ALT SERPL W P-5'-P-CCNC: 51 U/L (ref 0–70)
ANION GAP SERPL CALCULATED.3IONS-SCNC: 6 MMOL/L (ref 3–14)
AST SERPL W P-5'-P-CCNC: 23 U/L (ref 0–45)
BILIRUB SERPL-MCNC: 1.5 MG/DL (ref 0.2–1.3)
BUN SERPL-MCNC: 16 MG/DL (ref 7–30)
CALCIUM SERPL-MCNC: 9.6 MG/DL (ref 8.5–10.1)
CHLORIDE SERPL-SCNC: 106 MMOL/L (ref 94–109)
CHOLEST SERPL-MCNC: 203 MG/DL
CO2 SERPL-SCNC: 25 MMOL/L (ref 20–32)
CREAT SERPL-MCNC: 1.23 MG/DL (ref 0.66–1.25)
GFR SERPL CREATININE-BSD FRML MDRD: 65 ML/MIN/{1.73_M2}
GLUCOSE SERPL-MCNC: 93 MG/DL (ref 70–99)
HDLC SERPL-MCNC: 44 MG/DL
LDLC SERPL CALC-MCNC: 107 MG/DL
NONHDLC SERPL-MCNC: 159 MG/DL
POTASSIUM SERPL-SCNC: 4.2 MMOL/L (ref 3.4–5.3)
PROT SERPL-MCNC: 7.9 G/DL (ref 6.8–8.8)
PSA SERPL-ACNC: 0.77 UG/L (ref 0–4)
SODIUM SERPL-SCNC: 137 MMOL/L (ref 133–144)
TRIGL SERPL-MCNC: 259 MG/DL

## 2020-11-23 NOTE — RESULT ENCOUNTER NOTE
Dear Quirino,    Here is a summary of your recent test results:  -Normal red blood cell (hgb) levels, normal white blood cell count and normal platelet levels.  -PSA (prostate specific antigen) test is normal.  This indicates a low likelihood of prostate cancer.  ADVISE: rechecking this in 1 year.  -Cholesterol levels are at your goal levels.  ADVISE: continuing your medication, a regular exercise program with at least 150 minutes of aerobic exercise per week, and eating a low saturated fat/low carbohydrate diet.  Also, you should recheck this fasting cholesterol panel in 12 months.  -Liver and gallbladder tests are normal (ALT,AST, Alk phos, bilirubin), kidney function is normal (Cr, GFR), sodium is normal, potassium is normal, calcium is normal, glucose is normal.    For additional lab test information, [email or web address].org is an excellent reference.           Thank you very much for trusting me and [facility].     Have a peaceful day.    Healthy regards,  Alonzo Herrera MD

## 2021-03-29 ENCOUNTER — IMMUNIZATION (OUTPATIENT)
Dept: NURSING | Facility: CLINIC | Age: 56
End: 2021-03-29
Payer: COMMERCIAL

## 2021-03-29 PROCEDURE — 0001A PR COVID VAC PFIZER DIL RECON 30 MCG/0.3 ML IM: CPT

## 2021-03-29 PROCEDURE — 91300 PR COVID VAC PFIZER DIL RECON 30 MCG/0.3 ML IM: CPT

## 2021-04-19 ENCOUNTER — IMMUNIZATION (OUTPATIENT)
Dept: NURSING | Facility: CLINIC | Age: 56
End: 2021-04-19
Attending: INTERNAL MEDICINE
Payer: COMMERCIAL

## 2021-04-19 PROCEDURE — 91300 PR COVID VAC PFIZER DIL RECON 30 MCG/0.3 ML IM: CPT

## 2021-04-19 PROCEDURE — 0002A PR COVID VAC PFIZER DIL RECON 30 MCG/0.3 ML IM: CPT

## 2021-07-13 ENCOUNTER — OFFICE VISIT (OUTPATIENT)
Dept: FAMILY MEDICINE | Facility: CLINIC | Age: 56
End: 2021-07-13
Payer: COMMERCIAL

## 2021-07-13 VITALS
BODY MASS INDEX: 30.1 KG/M2 | WEIGHT: 215 LBS | RESPIRATION RATE: 16 BRPM | HEART RATE: 60 BPM | DIASTOLIC BLOOD PRESSURE: 76 MMHG | OXYGEN SATURATION: 99 % | TEMPERATURE: 97.5 F | SYSTOLIC BLOOD PRESSURE: 118 MMHG | HEIGHT: 71 IN

## 2021-07-13 DIAGNOSIS — R21 RASH: Primary | ICD-10-CM

## 2021-07-13 DIAGNOSIS — M25.541 ARTHRALGIA OF BOTH HANDS: ICD-10-CM

## 2021-07-13 DIAGNOSIS — L08.9 LOCAL INFECTION OF SKIN AND SUBCUTANEOUS TISSUE: ICD-10-CM

## 2021-07-13 DIAGNOSIS — R19.5 LOOSE STOOLS: ICD-10-CM

## 2021-07-13 DIAGNOSIS — M25.542 ARTHRALGIA OF BOTH HANDS: ICD-10-CM

## 2021-07-13 LAB
ERYTHROCYTE [DISTWIDTH] IN BLOOD BY AUTOMATED COUNT: 12.3 % (ref 10–15)
ERYTHROCYTE [SEDIMENTATION RATE] IN BLOOD BY WESTERGREN METHOD: 7 MM/HR (ref 0–20)
HCT VFR BLD AUTO: 46.6 % (ref 40–53)
HGB BLD-MCNC: 15.6 G/DL (ref 13.3–17.7)
MCH RBC QN AUTO: 30 PG (ref 26.5–33)
MCHC RBC AUTO-ENTMCNC: 33.5 G/DL (ref 31.5–36.5)
MCV RBC AUTO: 90 FL (ref 78–100)
PLATELET # BLD AUTO: 224 10E3/UL (ref 150–450)
RBC # BLD AUTO: 5.2 10E6/UL (ref 4.4–5.9)
WBC # BLD AUTO: 7 10E3/UL (ref 4–11)

## 2021-07-13 PROCEDURE — 85652 RBC SED RATE AUTOMATED: CPT | Performed by: NURSE PRACTITIONER

## 2021-07-13 PROCEDURE — 86140 C-REACTIVE PROTEIN: CPT | Performed by: NURSE PRACTITIONER

## 2021-07-13 PROCEDURE — 86038 ANTINUCLEAR ANTIBODIES: CPT | Performed by: NURSE PRACTITIONER

## 2021-07-13 PROCEDURE — 99214 OFFICE O/P EST MOD 30 MIN: CPT | Performed by: NURSE PRACTITIONER

## 2021-07-13 PROCEDURE — 84443 ASSAY THYROID STIM HORMONE: CPT | Performed by: NURSE PRACTITIONER

## 2021-07-13 PROCEDURE — 80048 BASIC METABOLIC PNL TOTAL CA: CPT | Performed by: NURSE PRACTITIONER

## 2021-07-13 PROCEDURE — 86431 RHEUMATOID FACTOR QUANT: CPT | Performed by: NURSE PRACTITIONER

## 2021-07-13 PROCEDURE — 85027 COMPLETE CBC AUTOMATED: CPT | Performed by: NURSE PRACTITIONER

## 2021-07-13 PROCEDURE — 36415 COLL VENOUS BLD VENIPUNCTURE: CPT | Performed by: NURSE PRACTITIONER

## 2021-07-13 RX ORDER — CEPHALEXIN 500 MG/1
500 CAPSULE ORAL 2 TIMES DAILY
Qty: 14 CAPSULE | Refills: 0 | Status: SHIPPED | OUTPATIENT
Start: 2021-07-13 | End: 2021-07-20

## 2021-07-13 ASSESSMENT — ANXIETY QUESTIONNAIRES
2. NOT BEING ABLE TO STOP OR CONTROL WORRYING: NOT AT ALL
3. WORRYING TOO MUCH ABOUT DIFFERENT THINGS: SEVERAL DAYS
5. BEING SO RESTLESS THAT IT IS HARD TO SIT STILL: NOT AT ALL
6. BECOMING EASILY ANNOYED OR IRRITABLE: NOT AT ALL
IF YOU CHECKED OFF ANY PROBLEMS ON THIS QUESTIONNAIRE, HOW DIFFICULT HAVE THESE PROBLEMS MADE IT FOR YOU TO DO YOUR WORK, TAKE CARE OF THINGS AT HOME, OR GET ALONG WITH OTHER PEOPLE: NOT DIFFICULT AT ALL
1. FEELING NERVOUS, ANXIOUS, OR ON EDGE: MORE THAN HALF THE DAYS
GAD7 TOTAL SCORE: 3
7. FEELING AFRAID AS IF SOMETHING AWFUL MIGHT HAPPEN: NOT AT ALL

## 2021-07-13 ASSESSMENT — PATIENT HEALTH QUESTIONNAIRE - PHQ9
SUM OF ALL RESPONSES TO PHQ QUESTIONS 1-9: 3
5. POOR APPETITE OR OVEREATING: NOT AT ALL

## 2021-07-13 ASSESSMENT — MIFFLIN-ST. JEOR: SCORE: 1819.42

## 2021-07-13 NOTE — PROGRESS NOTES
"    Assessment & Plan     Sina was seen today for derm problem.    Diagnoses and all orders for this visit:    Rash  History of urticaria, new rash onset with unclear etiology.   Will proceed with labs as below due to rash/bilateral hand joint pain, further evaluate for inflammatory/autoimmune process.    -     ESR: Erythrocyte sedimentation rate  -     CRP, inflammation  -     CBC with platelets  -     TSH with free T4 reflex  -     Basic metabolic panel  (Ca, Cl, CO2, Creat, Gluc, K, Na, BUN)    Local infection of skin and subcutaneous tissue  Right hand and left ankle with erythema surrounding opening of skin.  Will treat with keflex x 7 days.    -     cephALEXin (KEFLEX) 500 MG capsule; Take 1 capsule (500 mg) by mouth 2 times daily for 7 days    Arthralgia of both hands  -     Rheumatoid factor  -     Anti Nuclear Winsome IgG by IFA with Reflex    Intermittent loose stools  Discussed trial of Metamucil once daily (bring bulk/fiber into stool) and probiotics.   Follow-up with no improvement or worsening of symptoms.        30 minutes spent on the date of the encounter doing chart review, history and exam, documentation and further activities per      BMI:   Estimated body mass index is 30.41 kg/m  as calculated from the following:    Height as of this encounter: 1.791 m (5' 10.5\").    Weight as of this encounter: 97.5 kg (215 lb).     Return in about 1 week (around 7/20/2021) for No improvement or sooner with worsening symptoms.    Anca Escalante, SONDRA [facility] [place]        Alicia Win is a 56 year old who presents for the following health issues: rash    HPI     Rash  Onset/Duration: 6 weeks- has had trouble with skin problems,  Hit something and peeled some skin about a week ago, and then redness started yesterday on right hand and now on left hand.   States he had some hives abut a month ago- not sure what brought it on, in the past have appeared on leg.  Also gets sort of a " "yellow tongue- has talked to dentist about this 4 months ago, did have a tooth removed in January, was given amoxicillin and mouth rinse x 1 month later states this is when his yellow showed up on tongue     Redness/scabbing around mosquito bites on LE's.    Description  Location: Started getting a rash on right hand that is spreading out from scab from a small injury.  Later noticed rash on other hand and left foot as well.  Character: blotchy, burning, red  Itching: moderate  Intensity:  moderate  Progression of Symptoms:  Spreading more   Accompanying signs and symptoms:   Fever: no  Body aches or joint pain: no  Sore throat symptoms: no  Recent cold symptoms: no  History:           Previous episodes of similar rash: None  New exposures:  None  Recent travel: no  Exposure to similar rash: no  Precipitating or alleviating factors: nothing  Therapies tried and outcome: Aloe      Nystatin in April for thrush, prescribed by the [facility].   Has had yellowing of tongue, no other mouth lesions.  Happened after amoxicillin regimen.   Intermittent diarrhea since March after amoxicillin.      Current cold sore on right lower lip.      Intermittent joint pain - bilateral hands.      Increased stress related to closing on a cabin.  +Work stress.        Review of Systems   Constitutional, HEENT, cardiovascular, pulmonary, gi and gu systems are negative, except as otherwise noted.      Objective    /76   Pulse 60   Temp 97.5  F (36.4  C)   Resp 16   Ht 1.791 m (5' 10.5\")   Wt 97.5 kg (215 lb)   SpO2 99%   BMI 30.41 kg/m    Body mass index is 30.41 kg/m .  Physical Exam     GENERAL: healthy, alert and no distress  Mouth:  No ulcers or lesions  RESP: lungs clear to auscultation - no rales, rhonchi or wheezes  CV: regular rate and rhythm, normal S1 S2, no S3 or S4, no murmur, click or rub, no peripheral edema   MS: no gross musculoskeletal defects noted, no edema  SKIN: right hand with scabbed wound with " surrounding erythema that extends across hand and fingers  Left ankle with scabbed insect bite with mild surrounding erythema  PSYCH: mentation appears normal, affect normal/bright

## 2021-07-14 LAB
ANION GAP SERPL CALCULATED.3IONS-SCNC: 13 MMOL/L (ref 3–14)
BUN SERPL-MCNC: 17 MG/DL (ref 7–30)
CALCIUM SERPL-MCNC: 8.6 MG/DL (ref 8.5–10.1)
CHLORIDE BLD-SCNC: 107 MMOL/L
CO2 SERPL-SCNC: 21 MMOL/L (ref 20–32)
CREAT SERPL-MCNC: 1.29 MG/DL
CRP SERPL-MCNC: <2.9 MG/L (ref 0–8)
GFR SERPL CREATININE-BSD FRML MDRD: 62 ML/MIN/1.73M2
GLUCOSE BLD-MCNC: 101 MG/DL (ref 70–99)
POTASSIUM BLD-SCNC: 4.4 MMOL/L (ref 3.4–5.3)
SODIUM SERPL-SCNC: 141 MMOL/L (ref 133–144)
TSH SERPL DL<=0.005 MIU/L-ACNC: 2 MU/L (ref 0.4–4)

## 2021-07-14 ASSESSMENT — ANXIETY QUESTIONNAIRES: GAD7 TOTAL SCORE: 3

## 2021-07-14 NOTE — RESULT ENCOUNTER NOTE
Dear Quirino,     -Kidney function (GFR) is normal.  -Sodium is normal.  -Potassium is normal.  -Calcium is normal.  -Glucose is mildly elevated but you were nonfasting and this is okay.  -TSH (thyroid stimulating hormone) level is normal which indicates normal thyroid function.      Please send a [employer] message or call [phone]  if you have any questions.      Anca Escalante, APRN, CNP  [facility] [place]    If you have further questions about the interpretation of your labs, [email or web address] is a good website to check out for further information.

## 2021-07-14 NOTE — RESULT ENCOUNTER NOTE
Deabasim Win,     -Normal red blood cell (hgb) levels, normal white blood cell count and normal platelet levels.  -ESR (acute inflammation) was within a normal range and reassuring.        Please send a [employer] message or call [phone]  if you have any questions.      Anca Escalante, SONDRA, CNP  [facility] [place]    If you have further questions about the interpretation of your labs, [email or web address] is a good website to check out for further information.

## 2021-07-15 LAB
ANA PAT SER IF-IMP: NORMAL
ANA PAT SER IF-IMP: NORMAL
ANA SER QL IF: NEGATIVE
ANA TITR SER IF: NORMAL {TITER}
ANA TITR SER IF: NORMAL {TITER}

## 2021-07-15 NOTE — RESULT ENCOUNTER NOTE
Dear Quirino,     CRP (acute inflammation) was within a normal range and reassuring.      Please send a [employer] message or call [phone]  if you have any questions.      SONDRA Vásquez, CNP  [facility]    If you have further questions about the interpretation of your labs, [email or web address] is a good website to check out for further information.

## 2021-07-16 NOTE — RESULT ENCOUNTER NOTE
Dear Quirino,     DAVID (anti-nuclear antibody) - test to look for autoimmune disorders was normal and reassuring.        Please send a [employer] message or call [phone]  if you have any questions.      SONDRA Vásquez, [facility] [place]    If you have further questions about the interpretation of your labs, [email or web address] is a good website to check out for further information.

## 2021-07-20 LAB — RHEUMATOID FACT SER NEPH-ACNC: <7 IU/ML

## 2021-07-20 NOTE — RESULT ENCOUNTER NOTE
Dear Quirino,   RF (rheumatoid factor) was negative and reassuring.        Please send a [employer] message or call [phone]  if you have any questions.      SONDRA Vásquez, CNP  [facility]    If you have further questions about the interpretation of your labs, [email or web address] is a good website to check out for further information.

## 2021-09-04 ENCOUNTER — HEALTH MAINTENANCE LETTER (OUTPATIENT)
Age: 56
End: 2021-09-04

## 2021-12-25 ENCOUNTER — HEALTH MAINTENANCE LETTER (OUTPATIENT)
Age: 56
End: 2021-12-25

## 2022-01-12 DIAGNOSIS — E78.5 HYPERLIPIDEMIA LDL GOAL <100: ICD-10-CM

## 2022-01-17 RX ORDER — ATORVASTATIN CALCIUM 20 MG/1
20 TABLET, FILM COATED ORAL DAILY
Qty: 90 TABLET | Refills: 0 | Status: SHIPPED | OUTPATIENT
Start: 2022-01-17 | End: 2022-02-11

## 2022-01-17 NOTE — TELEPHONE ENCOUNTER
Routing refill request to provider for review/approval because:  Billie given x1 and patient did not follow up, please advise    Team please call to schedule visit         Vera Fraser RN

## 2022-01-17 NOTE — TELEPHONE ENCOUNTER
Has upcoming appointment, med sent    Last visit in this dept:    7/13/2021     Next visit in this dept:   Next 5 appointments (look out 90 days)    Feb 11, 2022  7:40 AM  (Arrive by 7:20 AM)  Adult Preventative Visit with Sina Herrera MD  [facility] ([facility] [place] ) [address] [place] [address] [phone]          Health Maintenance   Topic Date Due     ANNUAL REVIEW OF HM ORDERS  Never done     ZOSTER IMMUNIZATION (1 of 2) Never done     INFLUENZA VACCINE (1) 09/01/2021     PREVENTIVE CARE VISIT  11/20/2021     LIPID  11/20/2021     PSA  11/20/2021     PHQ-2  01/01/2022     PHQ-9  01/13/2022     DTAP/TDAP/TD IMMUNIZATION (3 - Td or Tdap) 10/03/2024     COLORECTAL CANCER SCREENING  03/27/2025     ADVANCE CARE PLANNING  11/20/2025     HEPATITIS C SCREENING  Completed     COVID-19 Vaccine  Completed     Pneumococcal Vaccine: Pediatrics (0 to 5 Years) and At-Risk Patients (6 to 64 Years)  Aged Out     IPV IMMUNIZATION  Aged Out     MENINGITIS IMMUNIZATION  Aged Out     HEPATITIS B IMMUNIZATION  Aged Out     HIV SCREENING  Discontinued

## 2022-02-11 ENCOUNTER — OFFICE VISIT (OUTPATIENT)
Dept: FAMILY MEDICINE | Facility: CLINIC | Age: 57
End: 2022-02-11
Payer: COMMERCIAL

## 2022-02-11 VITALS
TEMPERATURE: 97.9 F | HEART RATE: 66 BPM | OXYGEN SATURATION: 97 % | RESPIRATION RATE: 16 BRPM | DIASTOLIC BLOOD PRESSURE: 66 MMHG | HEIGHT: 71 IN | WEIGHT: 217 LBS | SYSTOLIC BLOOD PRESSURE: 106 MMHG | BODY MASS INDEX: 30.38 KG/M2

## 2022-02-11 DIAGNOSIS — E78.5 HYPERLIPIDEMIA LDL GOAL <100: ICD-10-CM

## 2022-02-11 DIAGNOSIS — Z13.0 SCREENING, DEFICIENCY ANEMIA, IRON: ICD-10-CM

## 2022-02-11 DIAGNOSIS — Z00.00 ROUTINE GENERAL MEDICAL EXAMINATION AT A HEALTH CARE FACILITY: Primary | ICD-10-CM

## 2022-02-11 DIAGNOSIS — Z12.5 SCREENING FOR PROSTATE CANCER: ICD-10-CM

## 2022-02-11 DIAGNOSIS — L65.9 ALOPECIA: ICD-10-CM

## 2022-02-11 DIAGNOSIS — N52.9 ERECTILE DYSFUNCTION, UNSPECIFIED ERECTILE DYSFUNCTION TYPE: ICD-10-CM

## 2022-02-11 LAB
ALBUMIN SERPL-MCNC: 3.9 G/DL (ref 3.4–5)
ALP SERPL-CCNC: 109 U/L (ref 40–150)
ALT SERPL W P-5'-P-CCNC: 38 U/L (ref 0–70)
ANION GAP SERPL CALCULATED.3IONS-SCNC: 2 MMOL/L (ref 3–14)
AST SERPL W P-5'-P-CCNC: 14 U/L (ref 0–45)
BILIRUB SERPL-MCNC: 1.1 MG/DL (ref 0.2–1.3)
BUN SERPL-MCNC: 13 MG/DL (ref 7–30)
CALCIUM SERPL-MCNC: 8.9 MG/DL (ref 8.5–10.1)
CHLORIDE BLD-SCNC: 110 MMOL/L (ref 94–109)
CHOLEST SERPL-MCNC: 160 MG/DL
CO2 SERPL-SCNC: 28 MMOL/L (ref 20–32)
CREAT SERPL-MCNC: 1.15 MG/DL (ref 0.66–1.25)
ERYTHROCYTE [DISTWIDTH] IN BLOOD BY AUTOMATED COUNT: 12.3 % (ref 10–15)
FASTING STATUS PATIENT QL REPORTED: YES
GFR SERPL CREATININE-BSD FRML MDRD: 75 ML/MIN/1.73M2
GLUCOSE BLD-MCNC: 99 MG/DL (ref 70–99)
HCT VFR BLD AUTO: 47.6 % (ref 40–53)
HDLC SERPL-MCNC: 47 MG/DL
HGB BLD-MCNC: 15.7 G/DL (ref 13.3–17.7)
LDLC SERPL CALC-MCNC: 65 MG/DL
MCH RBC QN AUTO: 29.3 PG (ref 26.5–33)
MCHC RBC AUTO-ENTMCNC: 33 G/DL (ref 31.5–36.5)
MCV RBC AUTO: 89 FL (ref 78–100)
NONHDLC SERPL-MCNC: 113 MG/DL
PLATELET # BLD AUTO: 203 10E3/UL (ref 150–450)
POTASSIUM BLD-SCNC: 4.4 MMOL/L (ref 3.4–5.3)
PROT SERPL-MCNC: 7.6 G/DL (ref 6.8–8.8)
PSA SERPL-MCNC: 0.59 UG/L (ref 0–4)
RBC # BLD AUTO: 5.35 10E6/UL (ref 4.4–5.9)
SODIUM SERPL-SCNC: 140 MMOL/L (ref 133–144)
TRIGL SERPL-MCNC: 239 MG/DL
WBC # BLD AUTO: 6.8 10E3/UL (ref 4–11)

## 2022-02-11 PROCEDURE — G0103 PSA SCREENING: HCPCS | Performed by: FAMILY MEDICINE

## 2022-02-11 PROCEDURE — 80053 COMPREHEN METABOLIC PANEL: CPT | Performed by: FAMILY MEDICINE

## 2022-02-11 PROCEDURE — 85027 COMPLETE CBC AUTOMATED: CPT | Performed by: FAMILY MEDICINE

## 2022-02-11 PROCEDURE — 80061 LIPID PANEL: CPT | Performed by: FAMILY MEDICINE

## 2022-02-11 PROCEDURE — 36415 COLL VENOUS BLD VENIPUNCTURE: CPT | Performed by: FAMILY MEDICINE

## 2022-02-11 PROCEDURE — 99396 PREV VISIT EST AGE 40-64: CPT | Performed by: FAMILY MEDICINE

## 2022-02-11 RX ORDER — FINASTERIDE 1 MG/1
1 TABLET, FILM COATED ORAL DAILY
Qty: 90 TABLET | Refills: 3 | Status: SHIPPED | OUTPATIENT
Start: 2022-02-11 | End: 2023-03-30

## 2022-02-11 RX ORDER — ALBUTEROL SULFATE 90 UG/1
2 AEROSOL, METERED RESPIRATORY (INHALATION) EVERY 4 HOURS PRN
Qty: 18 G | Refills: 0 | Status: CANCELLED | OUTPATIENT
Start: 2022-02-11

## 2022-02-11 RX ORDER — SILDENAFIL 50 MG/1
50 TABLET, FILM COATED ORAL DAILY PRN
Qty: 30 TABLET | Refills: 5 | Status: SHIPPED | OUTPATIENT
Start: 2022-02-11 | End: 2023-03-30

## 2022-02-11 RX ORDER — ATORVASTATIN CALCIUM 20 MG/1
20 TABLET, FILM COATED ORAL DAILY
Qty: 90 TABLET | Refills: 3 | Status: SHIPPED | OUTPATIENT
Start: 2022-02-11 | End: 2023-03-06

## 2022-02-11 ASSESSMENT — ENCOUNTER SYMPTOMS
SORE THROAT: 0
SHORTNESS OF BREATH: 0
DYSURIA: 0
NERVOUS/ANXIOUS: 0
WEAKNESS: 0
DIARRHEA: 0
CHILLS: 0
CONSTIPATION: 0
FEVER: 0
HEMATURIA: 0
ABDOMINAL PAIN: 0
MYALGIAS: 0
PALPITATIONS: 0
EYE PAIN: 0
JOINT SWELLING: 0
FREQUENCY: 0
HEADACHES: 0
COUGH: 0
HEARTBURN: 0
NAUSEA: 0
ARTHRALGIAS: 0
DIZZINESS: 0
HEMATOCHEZIA: 0
PARESTHESIAS: 0

## 2022-02-11 ASSESSMENT — ANXIETY QUESTIONNAIRES
GAD7 TOTAL SCORE: 1
7. FEELING AFRAID AS IF SOMETHING AWFUL MIGHT HAPPEN: NOT AT ALL
7. FEELING AFRAID AS IF SOMETHING AWFUL MIGHT HAPPEN: NOT AT ALL
5. BEING SO RESTLESS THAT IT IS HARD TO SIT STILL: NOT AT ALL
4. TROUBLE RELAXING: NOT AT ALL
1. FEELING NERVOUS, ANXIOUS, OR ON EDGE: SEVERAL DAYS
3. WORRYING TOO MUCH ABOUT DIFFERENT THINGS: NOT AT ALL
2. NOT BEING ABLE TO STOP OR CONTROL WORRYING: NOT AT ALL
6. BECOMING EASILY ANNOYED OR IRRITABLE: NOT AT ALL
GAD7 TOTAL SCORE: 1
GAD7 TOTAL SCORE: 1

## 2022-02-11 ASSESSMENT — PATIENT HEALTH QUESTIONNAIRE - PHQ9
SUM OF ALL RESPONSES TO PHQ QUESTIONS 1-9: 2
SUM OF ALL RESPONSES TO PHQ QUESTIONS 1-9: 2

## 2022-02-11 ASSESSMENT — MIFFLIN-ST. JEOR: SCORE: 1828.5

## 2022-02-11 NOTE — PROGRESS NOTES
SUBJECTIVE:   CC: Sina Stoner is an 56 year old male who presents for preventative health visit.       Patient has been advised of split billing requirements and indicates understanding: Yes  Healthy Habits:     Getting at least 3 servings of Calcium per day:  Yes    Bi-annual eye exam:  Yes    Dental care twice a year:  Yes    Sleep apnea or symptoms of sleep apnea:  Daytime drowsiness    Diet:  Low fat/cholesterol    Frequency of exercise:  1 day/week    Duration of exercise:  Less than 15 minutes    Taking medications regularly:  Yes    Medication side effects:  None    PHQ-2 Total Score: 0    Additional concerns today:  No    Answers for HPI/ROS submitted by the patient on 2/11/2022  PHQ9 TOTAL SCORE: 2  ASPEN 7 TOTAL SCORE: 1    Finasteride and Sildenafil- pt would like these in paper copy form.     Hyperlipidemia Follow-Up      Are you regularly taking any medication or supplement to lower your cholesterol?   Yes- atorvastatin    Are you having muscle aches or other side effects that you think could be caused by your cholesterol lowering medication?  No    Today's PHQ-2 Score:   PHQ-2 ( 1999 [employer]) 2/11/2022   Q1: Little interest or pleasure in doing things 0   Q2: Feeling down, depressed or hopeless 0   PHQ-2 Score 0   PHQ-2 Total Score (12-17 Years)- Positive if 3 or more points; Administer PHQ-A if positive -   Q1: Little interest or pleasure in doing things Not at all   Q2: Feeling down, depressed or hopeless Not at all   PHQ-2 Score 0       Abuse: Current or Past(Physical, Sexual or Emotional)- No  Do you feel safe in your environment? Yes        Social History     Tobacco Use     Smoking status: Never Smoker     Smokeless tobacco: Never Used   Substance Use Topics     Alcohol use: Yes     Alcohol/week: 0.0 standard drinks     Comment: 0-2 beers per week     If you drink alcohol do you typically have >3 drinks per day or >7 drinks per week? No    Alcohol Use 2/11/2022   Prescreen: >3 drinks/day or >7  "drinks/week? No   Prescreen: >3 drinks/day or >7 drinks/week? -       Last PSA:   PSA   Date Value Ref Range Status   11/20/2020 0.77 0 - 4 ug/L Final     Comment:     Assay Method:  Chemiluminescence using Siemens Vista analyzer       Reviewed orders with patient. Reviewed health maintenance and updated orders accordingly - Yes      Reviewed and updated as needed this visit by clinical staff  Tobacco  Allergies  Meds  Problems  Med Hx  Surg Hx  Fam Hx         Reviewed and updated as needed this visit by Provider  Tobacco  Allergies  Meds  Problems  Med Hx  Surg Hx  Fam Hx          Review of Systems   Constitutional: Negative for chills and fever.   HENT: Negative for congestion, ear pain, hearing loss and sore throat.    Eyes: Negative for pain and visual disturbance.   Respiratory: Negative for cough and shortness of breath.    Cardiovascular: Negative for chest pain, palpitations and peripheral edema.   Gastrointestinal: Negative for abdominal pain, constipation, diarrhea, heartburn, hematochezia and nausea.   Genitourinary: Negative for dysuria, frequency, genital sores, hematuria, impotence, penile discharge and urgency.   Musculoskeletal: Negative for arthralgias, joint swelling and myalgias.   Skin: Negative for rash.   Neurological: Negative for dizziness, weakness, headaches and paresthesias.   Psychiatric/Behavioral: Negative for mood changes. The patient is not nervous/anxious.      OBJECTIVE:   /66   Pulse 66   Temp 97.9  F (36.6  C)   Resp 16   Ht 1.791 m (5' 10.5\")   Wt 98.4 kg (217 lb)   SpO2 97%   BMI 30.70 kg/m    EXAM:  GENERAL: healthy, alert and no distress  EYES: Eyes grossly normal to inspection, PERRL and conjunctivae and sclerae normal  HENT: ear canals and TM's normal, nose and mouth without ulcers or lesions  NECK: no adenopathy, no asymmetry, masses, or scars and thyroid normal to palpation  RESP: lungs clear to auscultation - no rales, rhonchi or " "wheezes  BREAST: normal without masses, tenderness or nipple discharge and no palpable axillary masses or adenopathy  CV: regular rate and rhythm, normal S1 S2, no S3 or S4, no murmur, click or rub, no peripheral edema and peripheral pulses strong  ABDOMEN: soft, nontender, no hepatosplenomegaly, no masses and bowel sounds normal   (male): normal male genitalia without lesions or urethral discharge, no hernia  MS: no gross musculoskeletal defects noted, no edema  SKIN: no suspicious lesions or rashes  NEURO: Normal strength and tone, mentation intact and speech normal  PSYCH: mentation appears normal, affect normal/bright  LYMPH: no cervical, supraclavicular, axillary, or inguinal adenopathy  RECTAL: declined exam      ASSESSMENT/PLAN:   Routine general medical examination at a health care facility    Hyperlipidemia LDL goal <100  Controlled - continue medication.   - atorvastatin (LIPITOR) 20 MG tablet  Dispense: 90 tablet; Refill: 3  - Comprehensive metabolic panel (BMP + Alb, Alk Phos, ALT, AST, Total. Bili, TP)    Alopecia  Stable - continue medication(s)  - finasteride (PROPECIA) 1 MG tablet  Dispense: 90 tablet; Refill: 3    Erectile dysfunction, unspecified erectile dysfunction type  Stable - continue medication(s)  - sildenafil (VIAGRA) 50 MG tablet  Dispense: 30 tablet; Refill: 5    Screening, deficiency anemia, iron    - CBC with platelets    Screening for prostate cancer    - PROSTATE SPEC ANTIGEN SCREEN      COUNSELING:  Reviewed preventive health counseling, as reflected in patient instructions      Estimated body mass index is 30.7 kg/m  as calculated from the following:    Height as of this encounter: 1.791 m (5' 10.5\").    Weight as of this encounter: 98.4 kg (217 lb).  Weight management plan: Discussed healthy diet and exercise guidelines     reports that he has never smoked. He has never used smokeless tobacco.      Return in about 1 year (around 2/11/2023) for wellness exam with fasting labs " with Alonzo Herrera MD.         Alonzo Herrera MD     [address] [address]  [email or web address]     Office: [phone]

## 2022-02-12 DIAGNOSIS — L65.9 ALOPECIA: ICD-10-CM

## 2022-02-12 ASSESSMENT — ANXIETY QUESTIONNAIRES: GAD7 TOTAL SCORE: 1

## 2022-02-14 NOTE — TELEPHONE ENCOUNTER
Patient didn't know medication was sent to  pharmacy, no need for the [facility] refill he will come over and pick it up at      Youcan cancel this order

## 2022-02-15 RX ORDER — FINASTERIDE 1 MG/1
TABLET, FILM COATED ORAL
Qty: 90 TABLET | Refills: 3 | OUTPATIENT
Start: 2022-02-15

## 2022-02-17 NOTE — RESULT ENCOUNTER NOTE
Dear Quirino,    Here is a summary of your recent test results:  -Normal red blood cell (hgb) levels, normal white blood cell count and normal platelet levels.  -PSA (prostate specific antigen) test is normal.  This indicates a low likelihood of prostate cancer.  ADVISE: rechecking this in 1 year.  -Cholesterol levels are at your goal levels.  ADVISE: continuing your medication, a regular exercise program with at least 150 minutes of aerobic exercise per week, and eating a low saturated fat/low carbohydrate diet.  Also, you should recheck this fasting cholesterol panel in 12 months.  -Liver and gallbladder tests are normal (ALT,AST, Alk phos, bilirubin), kidney function is normal (Cr, GFR), sodium is normal, potassium is normal, calcium is normal, glucose is normal.           Thank you very much for trusting me and [facility].     Have a peaceful day.    Healthy regards,  Alonzo Herrera MD

## 2022-03-24 ENCOUNTER — OFFICE VISIT (OUTPATIENT)
Dept: URGENT CARE | Facility: URGENT CARE | Age: 57
End: 2022-03-24
Payer: COMMERCIAL

## 2022-03-24 ENCOUNTER — NURSE TRIAGE (OUTPATIENT)
Dept: FAMILY MEDICINE | Facility: CLINIC | Age: 57
End: 2022-03-24
Payer: COMMERCIAL

## 2022-03-24 VITALS
RESPIRATION RATE: 16 BRPM | HEART RATE: 72 BPM | BODY MASS INDEX: 30.38 KG/M2 | WEIGHT: 217 LBS | OXYGEN SATURATION: 97 % | HEIGHT: 71 IN | SYSTOLIC BLOOD PRESSURE: 128 MMHG | TEMPERATURE: 98.6 F | DIASTOLIC BLOOD PRESSURE: 74 MMHG

## 2022-03-24 DIAGNOSIS — R10.84 ABDOMINAL PAIN, GENERALIZED: Primary | ICD-10-CM

## 2022-03-24 DIAGNOSIS — R14.0 ABDOMINAL BLOATING: ICD-10-CM

## 2022-03-24 LAB
ALBUMIN SERPL-MCNC: 4 G/DL (ref 3.4–5)
ALP SERPL-CCNC: 106 U/L (ref 40–150)
ALT SERPL W P-5'-P-CCNC: 29 U/L (ref 0–70)
ANION GAP SERPL CALCULATED.3IONS-SCNC: 3 MMOL/L (ref 3–14)
AST SERPL W P-5'-P-CCNC: 17 U/L (ref 0–45)
BASOPHILS # BLD AUTO: 0 10E3/UL (ref 0–0.2)
BASOPHILS NFR BLD AUTO: 0 %
BILIRUB SERPL-MCNC: 1.7 MG/DL (ref 0.2–1.3)
BUN SERPL-MCNC: 15 MG/DL (ref 7–30)
CALCIUM SERPL-MCNC: 9.3 MG/DL (ref 8.5–10.1)
CHLORIDE BLD-SCNC: 108 MMOL/L (ref 94–109)
CO2 SERPL-SCNC: 26 MMOL/L (ref 20–32)
CREAT SERPL-MCNC: 1.27 MG/DL (ref 0.66–1.25)
EOSINOPHIL # BLD AUTO: 0.3 10E3/UL (ref 0–0.7)
EOSINOPHIL NFR BLD AUTO: 3 %
ERYTHROCYTE [DISTWIDTH] IN BLOOD BY AUTOMATED COUNT: 12.8 % (ref 10–15)
GFR SERPL CREATININE-BSD FRML MDRD: 66 ML/MIN/1.73M2
GLUCOSE BLD-MCNC: 94 MG/DL (ref 70–99)
HCT VFR BLD AUTO: 48.3 % (ref 40–53)
HGB BLD-MCNC: 16 G/DL (ref 13.3–17.7)
LYMPHOCYTES # BLD AUTO: 2.1 10E3/UL (ref 0.8–5.3)
LYMPHOCYTES NFR BLD AUTO: 26 %
MCH RBC QN AUTO: 29.4 PG (ref 26.5–33)
MCHC RBC AUTO-ENTMCNC: 33.1 G/DL (ref 31.5–36.5)
MCV RBC AUTO: 89 FL (ref 78–100)
MONOCYTES # BLD AUTO: 0.7 10E3/UL (ref 0–1.3)
MONOCYTES NFR BLD AUTO: 8 %
NEUTROPHILS # BLD AUTO: 5.2 10E3/UL (ref 1.6–8.3)
NEUTROPHILS NFR BLD AUTO: 63 %
PLATELET # BLD AUTO: 220 10E3/UL (ref 150–450)
POTASSIUM BLD-SCNC: 4 MMOL/L (ref 3.4–5.3)
PROT SERPL-MCNC: 7.8 G/DL (ref 6.8–8.8)
RBC # BLD AUTO: 5.44 10E6/UL (ref 4.4–5.9)
SODIUM SERPL-SCNC: 137 MMOL/L (ref 133–144)
WBC # BLD AUTO: 8.2 10E3/UL (ref 4–11)

## 2022-03-24 PROCEDURE — 36415 COLL VENOUS BLD VENIPUNCTURE: CPT | Performed by: FAMILY MEDICINE

## 2022-03-24 PROCEDURE — 80053 COMPREHEN METABOLIC PANEL: CPT | Performed by: FAMILY MEDICINE

## 2022-03-24 PROCEDURE — 99214 OFFICE O/P EST MOD 30 MIN: CPT | Performed by: FAMILY MEDICINE

## 2022-03-24 PROCEDURE — 85025 COMPLETE CBC W/AUTO DIFF WBC: CPT | Performed by: FAMILY MEDICINE

## 2022-03-24 NOTE — PATIENT INSTRUCTIONS
Patient Education     Abdominal Pain  Abdominal pain is pain in the stomach or belly area. Everyone has this pain from time to time. In many cases it goes away on its own. But abdominal pain can sometimes be due to a serious problem, such as appendicitis. So it s important to know when to get help.    Causes of abdominal pain  There are many possible causes of abdominal pain. Common causes in adults include:    Constipation, diarrhea, or gas    Stomach acid flowing back up into the esophagus (acid reflux or heartburn)    Severe acid reflux, called GERD (gastroesophageal reflux disease)    A sore in the lining of the stomach or small intestine (peptic ulcer)    Inflammation of the gallbladder, liver, or pancreas    Gallstones or kidney stones    Appendicitis     Intestinal blockage     An internal organ pushing through a muscle or other tissue (hernia)    Urinary tract infections    In women, menstrual cramps, fibroids, ovarian cysts, pelvic inflammatory disease, or endometriosis    Inflammation or infection of the intestines, including Crohn's disease and ulcerative colitis    Irritable bowel syndrome  Diagnosing the cause of abdominal pain  Your healthcare provider will give you a physical exam help find the cause of your pain. If needed, you will have tests. Belly pain has many possible causes. So it can be hard to find the reason for your pain. Giving details about your pain can help. Tell your provider where and when you feel the pain, and what makes it better or worse. Also let your provider know if you have other symptoms such as:    Fever    Tiredness    Upset stomach (nausea)    Vomiting    Changes in bathroom habits    Blood in the stool or black, tarry stool    Weight loss that you can't explain (involuntary weight loss?)  Also report any family history of stomach or intestinal problems, or cancers. Tell your provider about all your alcohol use and drug use. Tell your provider about all medicines you  use, including herbs, vitamins, and supplements.  Treating abdominal pain  Some causes of pain need emergency medical treatment right away. These include appendicitis or a bowel blockage. Other problems can be treated with rest, fluids, or medicines. Your healthcare provider can give you specific instructions for treatment or self-care based on what is causing your pain.     If you have vomiting or diarrhea, sip water or other clear fluids. When you are ready to eat solid foods again, start with small amounts of easy-to-digest, low-fat foods. These include apple sauce, toast, or crackers.  When to get medical care  Call 911 or go to the hospital right away if you:    Can t pass stool and are vomiting    Are vomiting blood or have bloody diarrhea or black, tarry diarrhea    Have chest, neck, or shoulder pain    Feel like you might pass out    Have pain in your shoulder blades with nausea    Have sudden, severe belly pain    Have new, severe pain unlike any you have felt before    Have a belly that is rigid, hard, and hurts to touch  Call your healthcare provider if you have:    Pain for more than 5 days    Bloating for more than 2 days    Diarrhea for more than 5 days    A fever of 100.4 F (38 C) or higher, or as directed by your healthcare provider    Pain that gets worse    Weight loss for no reason    Continued lack of appetite    Blood in your stool  How to prevent abdominal pain  Here are some tips to help prevent abdominal pain:    Eat smaller amounts of food at each meal.    Don't eat greasy, fried, or other high-fat foods.    Don't eat foods that give you gas.    Exercise regularly.    Drink plenty of fluids.  To help prevent GERD symptoms:    Quit smoking.    Reduce alcohol and foods that increase stomach acid.    Don't use aspirin or over-the-counter pain and fever medicines, if possible. This includes nonsteroidal anti-inflammatory drugs (NSAIDs).    Lose excess weight.    Finish eating at least 2 hours  before you go to bed or lie down.    Raise the head of your bed.  [employer] last reviewed this educational content on 4/1/2019 2000-2021 [employer]. All rights reserved. This information is not intended as a substitute for professional medical care. Always follow your healthcare professional's instructions.           Patient Education     Diarrhea with Uncertain Cause (Adult)    Diarrhea is when stools are loose and watery. This can be caused by:    Viral infections    Bacterial infections    Food poisoning    Parasites    Irritable bowel syndrome (IBS)    Inflammatory bowel diseases such as ulcerative colitis, Crohn's disease, and celiac disease    Food intolerance, such as to lactose, the sugar found in milk and milk products    Reaction to medicines like antibiotics, laxatives, cancer drugs, and antacids  Along with diarrhea, you may also have:    Abdominal pain and cramping    Nausea and vomiting    Loss of bowel control    Fever and chills    Bloody stools  In some cases, antibiotics may help to treat diarrhea. You may have a stool sample test. This is done to see what is causing your diarrhea, and if antibiotics will help treat it. The results of a stool sample test may take up to 2 days. The healthcare provider may not give you antibiotics until he or she has the stool test results.  Diarrhea can cause dehydration. This is the loss of too much water and other fluids from the body. When this occurs, body fluid must be replaced. This can be done with oral rehydration solutions. Oral rehydration solutions are available at drugstores and grocery stores without a prescription. Sports drinks are not the best choice if you are very dehydrated. They have too much sugar and not enough electrolytes.  Home care  Follow all instructions given by your healthcare provider. Rest at home for the next 24 hours, or until you feel better. Avoid caffeine, tobacco, and alcohol. These can make diarrhea, cramping,  and pain worse.  If taking medicines:    Over-the-counter nausea and diarrhea medicines are generally OK unless you experience fever or blood stool. Check with your doctor first in those circumstances.    You may use acetaminophen or NSAID medicines like ibuprofen or naproxen to reduce pain and fever. Don t use these if you have chronic liver or kidney disease, or ever had a stomach ulcer or gastrointestinal bleeding. Don't use NSAID medicines if you are already taking one for another condition (like arthritis) or are on daily aspirin therapy (such as for heart disease or after a stroke). Talk with your healthcare provider first.    If antibiotics were prescribed, be sure you take them until they are finished. Don t stop taking them even when you feel better. Antibiotics must be taken as a full course.  To prevent the spread of illness:    Remember that washing with soap and water and using alcohol-based  is the best way to prevent the spread of infection. Dry your hands with a single use towel (like a paper towel).    Clean the toilet after each use.    Wash your hands before eating.    Wash your hands before and after preparing food. Keep in mind that people with diarrhea or vomiting should not prepare food for others.    Wash your hands after using cutting boards, countertops, and knives that have been in contact with raw foods.    Wash and then peel fruits and vegetables.    Keep uncooked meats away from cooked and ready-to-eat foods.    Use a food thermometer when cooking. Cook poultry to at least 165 F (74 C). Cook ground meat (beef, veal, pork, lamb) to at least 160 F (71 C). Cook fresh beef, veal, lamb, and pork to at least 145 F (63 C).    Don t eat raw or undercooked eggs (poached or jaki side up), poultry, meat, or unpasteurized milk and juices.  Food and drinks  The main goal while treating vomiting or diarrhea is to prevent dehydration. This is done by taking small amounts of liquids  often.    Keep in mind that liquids are more important than food right now.    Drink only small amounts of liquids at a time.    Don t force yourself to eat, especially if you are having cramping, vomiting, or diarrhea. Don t eat large amounts at a time, even if you are hungry.    If you eat, avoid fatty, greasy, spicy, or fried foods.    Don t eat dairy foods or drink milk if you have diarrhea. These can make diarrhea worse.  During the first 24 hours you can try:    Oral rehydration solutions.  Sports drinks may be used if you are not too dehydrated and are otherwise healthy.    Soft drinks without caffeine    Ginger ale    Water (plain or flavored)    Decaf tea or coffee    Clear broth, consommé, or bouillon    Gelatin, popsicles, or frozen fruit juice bars  The second 24 hours, if you are feeling better, you can add:    Hot cereal, plain toast, bread, rolls, or crackers    Plain noodles, rice, mashed potatoes, chicken noodle soup, or rice soup    Unsweetened canned fruit (no pineapple)    Bananas  As you recover:    Limit fat intake to less than 15 grams per day. Don t eat margarine, butter, oils, mayonnaise, sauces, gravies, fried foods, peanut butter, meat, poultry, or fish.    Limit fiber. Don t eat raw or cooked vegetables, fresh fruits except bananas, or bran cereals.    Limit caffeine and chocolate.    Limit dairy.    Don t use spices or seasonings except salt.    Go back to your normal diet over time, as you feel better and your symptoms improve.    If the symptoms come back, go back to a simple diet or clear liquids.  Follow-up care  Follow up with your healthcare provider, or as advised. If a stool sample was taken or cultures were done, call the healthcare provider for the results as instructed.  Call 911  Call 911 if you have any of these symptoms:    Trouble breathing    Confusion    Extreme drowsiness or trouble walking    Loss of consciousness    Rapid heart rate    Chest pain    Stiff  neck    Seizure  When to seek medical advice  Call your healthcare provider right away if any of these occur:    Abdominal pain that gets worse    Constant lower right abdominal pain    Continued vomiting and inability to keep liquids down    Diarrhea more than 5 times a day    Blood in vomit or stool    Dark urine or no urine for 8 hours, dry mouth and tongue, tiredness, weakness, or dizziness    Drowsiness    New rash    You don t get better in 2 to 3 days    Fever of 100.4 F (38 C) or higher, or as directed by your healthcare provider  [employer] last reviewed this educational content on 6/1/2018 2000-2021 [employer]. All rights reserved. This information is not intended as a substitute for professional medical care. Always follow your healthcare professional's instructions.           Patient Education     Food Poisoning or Viral Gastroenteritis (Adult)  You have a stomach illness that is likely either food poisoning or viral gastroenteritis.  Food poisoning is illness that is passed along in food and affects the stomach and intestinal tract. It usually occurs from 1 to 24 hours after eating food that has spoiled. When it happens within a few hours of eating, it is often caused by toxins from bacteria in food that has not been cooked or refrigerated properly.  Viral gastroenteritis is an illness from a virus that also affects the stomach and intestinal tract. Many people call it the  stomach flu,  but it has nothing to do with influenza. In fact, it can happen from food poisoning, but it can also happen when germs are passed from person-to-person or contaminated surface (toothbrush, cutting board, toilet) to a person.  Either illness can cause these symptoms:    Abdominal pain and cramping    Nausea    Vomiting    Diarrhea    Fever and chills    Loss of bowel control  The symptoms of food poisoning usually last 1 to 2 days. The symptoms of viral gastroenteritis can sometimes last up to 7 days but  usually end sooner.  Antibiotics are not effective for either illness.  Other causes of gastroenteritis include bacteria and parasites which are not discussed here.  Home care  Follow all instructions given by your healthcare provider. Rest at home for the next 24 hours, or until you feel better. Avoid caffeine, tobacco, and alcohol. These can make diarrhea, cramping, and pain worse.  If taking medicines:    Over-the-counter diarrhea or nausea medicines are generally OK unless you have bleeding, fever, or severe abdominal pain.    You may use acetaminophen or NSAID medicines like ibuprofen or naproxen to reduce pain and fever. Don t use these if you have chronic liver or kidney disease, or ever had a stomach ulcer or gastrointestinal bleeding. Talk with your healthcare provider first. Don't use NSAID medicines if you are already taking one for another condition (like arthritis) or are on daily aspirin therapy (such as for heart disease or after a stroke).  To prevent the spread of illness:    Remember that washing with soap and water is the best way to prevent the spread of infection. Wash your hands before and after caring for a sick person. Dry your hands with a single use towel.    Clean the toilet after each use.    Wash your hands or use alcohol-based hand  before eating.    Wash your hands or use alcohol-based hand  before and after preparing food. Keep in mind that people with diarrhea or vomiting should not prepare food for others.    Wash your hands or use alcohol-based hand  after using cutting boards, counter-tops, and knives (and other utensils) that have been in contact with raw foods.    Wash and then peel fruits and vegetables.    Keep uncooked meats away from cooked and ready-to-eat foods.    Use a food thermometer when cooking. Cook poultry to at least 165 F (74 C). Cook ground meat (beef, veal, pork, lamb) to at least 160 F (71 C). Cook fresh beef, veal, lamb, and pork to  at least 145 F (63 C).    Don t eat raw or undercooked eggs (poached or jaki side up), poultry, meat or unpasteurized milk and juices.  Food and drinks  The main goal while treating vomiting or diarrhea is to prevent dehydration. This is done by taking small amounts of liquids often.    Keep in mind that liquids are more important than food right now.    Drink only small amounts of liquids at a time.    Don t force yourself to eat, especially if you are having cramping, vomiting, or diarrhea. Don t eat large amounts at a time, even if you are hungry.    If you eat, avoid fatty, greasy, spicy, or fried foods.    Don t eat dairy foods or drink milk if you have diarrhea. These can make diarrhea worse.  The first 24 hours you can try:    Oral rehydration solutions, available at grocery stores and pharmacies. Sports drinks are not a good choice if you are very dehydrated. They have too much sugar and not enough electrolytes.    Soft drinks without caffeine    Ginger ale    Water (plain or flavored)    Decaf tea or coffee    Clear broth, consommé, or bouillon    Gelatin, ice pops, or frozen fruit juice bars   The second 24 hours, if you are feeling better, you can add:    Hot cereal, plain toast, bread, rolls, or crackers    Plain noodles, rice, mashed potatoes, chicken noodle soup, or rice soup    Unsweetened canned fruit (no pineapple)    Bananas  As you recover:    Limit fat intake to less than 15 grams per day. Don t eat margarine, butter, oils, mayonnaise, sauces, gravies, fried foods, peanut butter, meat, poultry, or fish.    Limit fiber. Don t eat raw or cooked vegetables, fresh fruits except bananas, and bran cereals.    Limit caffeine and chocolate.    Don t use spices or seasonings except salt.    Resume a normal diet over time, as you feel better and your symptoms improve.    If the symptoms come back, go back to a simple diet or clear liquids.  Follow-up care  Follow up with your healthcare provider, or as  advised. If a stool sample was taken or cultures were done, call the healthcare provider for the results as instructed.  Call 911  Call 911 if you have any of these symptoms:    Trouble breathing    Confusion    Extreme drowsiness or trouble walking    Loss of consciousness    Rapid heart rate    Chest pain    Stiff neck    Seizure  When to seek medical advice  Call your healthcare provider right away if any of these occur:    Abdominal pain that gets worse    Constant lower right abdominal pain    Continued vomiting and inability to keep liquids down    Diarrhea more than 5 times a day    Blood in vomit or stool    Dark urine or no urine for 8 hours, dry mouth and tongue, tiredness, weakness, or dizziness    New rash    You don t get better in 2 to 3 days    Fever of 100.4 F (38 C) or higher, or as directed by your healthcare provider    You have new symptoms of arthritis  [employer] last reviewed this educational content on 6/1/2018 2000-2021 [employer]. All rights reserved. This information is not intended as a substitute for professional medical care. Always follow your healthcare professional's instructions.

## 2022-03-24 NOTE — TELEPHONE ENCOUNTER
"Advised patient go into urgent care this evening. He knows where a [facility] is located near him and he agrees to go.  Informed patient if pain or symptoms worsen, to go to ER and he stated that he understands.     Reason for Disposition    MILD pain that comes and goes (cramps) lasts > 24 hours    Additional Information    Negative: Passed out (i.e., fainted, collapsed and was not responding)    Negative: Shock suspected (e.g., cold/pale/clammy skin, too weak to stand, low BP, rapid pulse)    Negative: Sounds like a life-threatening emergency to the triager    Negative: Chest pain    Negative: Pain is mainly in upper abdomen (if needed ask: 'is it mainly above the belly button?')    Negative: SEVERE abdominal pain (e.g., excruciating)    Negative: Vomiting red blood or black (coffee ground) material    Negative: Bloody, black, or tarry bowel movements (Exception: chronic-unchanged black-grey bowel movements and is taking iron pills or Pepto-bismol)    Negative: Unable to urinate (or only a few drops) and bladder feels very full    Negative: Pain in scrotum persists > 1 hour    Negative: Constant abdominal pain lasting > 2 hours    Negative: Vomiting bile (green color)    Negative: Patient sounds very sick or weak to the triager    Negative: Vomiting and abdomen looks much more swollen than usual    Negative: White of the eyes have turned yellow (i.e., jaundice)    Negative: Blood in urine (red, pink, or tea-colored)    Negative: Fever > 103 F (39.4 C)    Negative: Fever > 101 F (38.3 C) and over 60 years of age    Negative: Fever > 100.0 F (37.8 C) and has diabetes mellitus or a weak immune system (e.g., HIV positive, cancer chemotherapy, organ transplant, splenectomy, chronic steroids)    Negative: Fever > 100.0 F (37.8 C) and bedridden (e.g., nursing home patient, stroke, chronic illness, recovering from surgery)    Answer Assessment - Initial Assessment Questions  1. LOCATION: \"Where does it hurt?\"       Under " "belly button  2. RADIATION: \"Does the pain shoot anywhere else?\" (e.g., chest, back)      No radiation  3. ONSET: \"When did the pain begin?\" (Minutes, hours or days ago)       4 days ago  4. SUDDEN: \"Gradual or sudden onset?\"      gradual  5. PATTERN \"Does the pain come and go, or is it constant?\"     - If constant: \"Is it getting better, staying the same, or worsening?\"       (Note: Constant means the pain never goes away completely; most serious pain is constant and it progresses)      - If intermittent: \"How long does it last?\" \"Do you have pain now?\"      (Note: Intermittent means the pain goes away completely between bouts)      Comes and goes  6. SEVERITY: \"How bad is the pain?\"  (e.g., Scale 1-10; mild, moderate, or severe)     - MILD (1-3): doesn't interfere with normal activities, abdomen soft and not tender to touch      - MODERATE (4-7): interferes with normal activities or awakens from sleep, tender to touch      - SEVERE (8-10): excruciating pain, doubled over, unable to do any normal activities        Currently 2/10  7. RECURRENT SYMPTOM: \"Have you ever had this type of abdominal pain before?\" If so, ask: \"When was the last time?\" and \"What happened that time?\"       Not had this kind of cramping before  8. CAUSE: \"What do you think is causing the abdominal pain?\"      Not sure  9. RELIEVING/AGGRAVATING FACTORS: \"What makes it better or worse?\" (e.g., movement, antacids, bowel movement)      Eating makes it worse so he's been eating less often   10. OTHER SYMPTOMS: \"Has there been any vomiting, diarrhea, constipation, or urine problems?\"        Denies vomiting, had an episode of diarrhea 4 days ago when this started    Protocols used: ABDOMINAL PAIN - MALE-A-OH    Hilaria Xiong BSN, RN    "

## 2022-03-30 NOTE — PROGRESS NOTES
"SUBJECTIVE:  Sina Stoner, a 56 year old male scheduled an appointment to discuss the following issues:     Abdominal pain, generalized  Abdominal bloating    Medical, social, surgical, and family histories reviewed.     Abdominal Pain (mid to low stomach pain, also low left quarndant pain at times x Sunday)   Diarrhea (x Sunday  -- he  did TRY a NEW yogurt  -- taking some extra fiber)    5 days duration of diarrhea and abdominal bloating.  Had loose watery stool for 2 days, no melena or hematochezia.  Prior to the onset of symptoms pt had some restaurant food and alcohol.  Non-smoker, no street drugs use.  Also ate large amount of yogurt prior to onset of symptoms, and had extra fiber supplements (4 caps/day X 2 days).  Denies hx of lactose intolerance.  Pt states the abdominal cramping is worse last night, waking him up from sleep.  Pt works as a , vaccinated for COVID-19, no upper respiratory symptoms.  No hx of COVID-19.   No heartburn.    ROS:  See HPI.  No nausea/vomiting.  No fever/chills.  No chest pain/SOB.  No urine problems.  No dizziness or syncope.      OBJECTIVE:  /74 (BP Location: Right arm, Patient Position: Chair, Cuff Size: Adult Regular)   Pulse 72   Temp 98.6  F (37  C) (Oral)   Resp 16   Ht 1.791 m (5' 10.5\")   Wt 98.4 kg (217 lb)   SpO2 97%   BMI 30.70 kg/m    EXAM:  GENERAL APPEARANCE: alert and mild distress; no cyanosis or accessory muscle use; moist mucus membrane, not septic, afebrile  EYES: Eyes grossly normal to inspection, PERRL and conjunctivae and sclerae normal  HENT: ear canals and TM's normal and nose and mouth without ulcers or lesions  NECK: no adenopathy, no asymmetry, masses, or scars and thyroid normal to palpation  RESP: lungs clear to auscultation - no rales, rhonchi or wheezes  CV: regular rates and rhythm, normal S1 S2, no S3 or S4 and no murmur, click or rub  LYMPHATICS: no cervical adenopathy  ABDOMEN: soft, mild non-specific tenderness " at epigastric and LUQ region without rebound; no hepatosplenomegaly or masses and bowel sounds normal; negative Hall and McBurney, no CVA tenderness; no ascites  MS: extremities normal- no gross deformities noted  SKIN: no suspicious lesions or rashes  NEURO: Normal strength and tone, mentation intact and speech normal    ASSESSMENT/PLAN:  (R10.84) Abdominal pain, generalized  (primary encounter diagnosis)  Comment: CBC+diff normal; CMP showed mildly increased bilirubin and creatinine, otherwise normal   Plan: CBC with platelets and differential,         Comprehensive metabolic panel (BMP + Alb, Alk         Phos, ALT, AST, Total. Bili, TP)         (R14.0) Abdominal bloating  Comment: see above; abdominal symptoms likely secondary to irritation by food and alcohol ( restaurant food could have caused food poisoning, excessive yogurt and fiber may have resulted in bloating and gaseousness); benign abdomen clinically  Plan: CBC with platelets and differential,         Comprehensive metabolic panel (BMP + Alb, Alk         Phos, ALT, AST, Total. Bili, TP)    Rest bowel with liquid diet for 1-3 days, with gradual increase in diet and tolerated.  Avoid alcohol, caffeine and NSAIDs.  Adequate fluid intake to maintain hydration. Stay mobile to aid digestion.  Pt to f/up PCP within 1 week if no improvement or worsening.  Warning signs and symptoms explained.

## 2022-09-20 ENCOUNTER — OFFICE VISIT (OUTPATIENT)
Dept: ORTHOPEDICS | Facility: CLINIC | Age: 57
End: 2022-09-20

## 2022-09-20 ENCOUNTER — ANCILLARY PROCEDURE (OUTPATIENT)
Dept: GENERAL RADIOLOGY | Facility: CLINIC | Age: 57
End: 2022-09-20
Attending: STUDENT IN AN ORGANIZED HEALTH CARE EDUCATION/TRAINING PROGRAM
Payer: COMMERCIAL

## 2022-09-20 VITALS
WEIGHT: 216 LBS | SYSTOLIC BLOOD PRESSURE: 128 MMHG | HEIGHT: 70 IN | DIASTOLIC BLOOD PRESSURE: 90 MMHG | BODY MASS INDEX: 30.92 KG/M2

## 2022-09-20 DIAGNOSIS — S69.91XA INJURY OF RIGHT THUMB: ICD-10-CM

## 2022-09-20 DIAGNOSIS — S69.91XA INJURY OF RIGHT THUMB, INITIAL ENCOUNTER: Primary | ICD-10-CM

## 2022-09-20 PROCEDURE — 99204 OFFICE O/P NEW MOD 45 MIN: CPT | Performed by: STUDENT IN AN ORGANIZED HEALTH CARE EDUCATION/TRAINING PROGRAM

## 2022-09-20 PROCEDURE — 73130 X-RAY EXAM OF HAND: CPT | Mod: TC | Performed by: RADIOLOGY

## 2022-09-20 RX ORDER — CEPHALEXIN 500 MG/1
500 CAPSULE ORAL 2 TIMES DAILY
Qty: 20 CAPSULE | Refills: 0 | Status: SHIPPED | OUTPATIENT
Start: 2022-09-20 | End: 2022-09-30

## 2022-09-20 ASSESSMENT — PAIN SCALES - GENERAL: PAINLEVEL: MODERATE PAIN (4)

## 2022-09-20 NOTE — PATIENT INSTRUCTIONS
Thank you for choosing [facility]    Dr. Brar Locations:    [facility] [address], [address]  [address] [address]   Appointments: [phone] Appointments: [phone]   Fax: [phone] Fax: [phone]     Follow up: 4 weeks    Hand Therapy orders have been placed with [facility] (formally [facility])  You can call [phone] to schedule at your convenience.     Please call [phone] to schedule your follow up appointment.

## 2022-09-20 NOTE — PROGRESS NOTES
Orthopaedic Surgery Hand and Upper Extremity Clinic H&P NOTE:  Date: Sep 20, 2022    Patient Name: Sina Stoner  MRN: [id number]    CHIEF COMPLAINT: Right thumb crush injury    Dominant Hand: right  Occupation:       HPI:  Mr. Sina Stoner is a 57 year old male right hand dominant who presents with crush injury to right thumb. Hand got crushed by heavy marble table on 9/9/22 at his cabin. History of trigger thumb.  X-rays at the time demonstrated nondisplaced fractures of the distal and proximal phalanx of the thumb.  He also sustained a superficial wound on the volar aspect of the proximal phalanx near the IP joint.  He was started on Keflex which he has finished.  He states that he continues to ooze yellow crusty fluid.  He has been applying bacitracin to it.  He was given a splint but it is opening so he is just been babying the thumb.  Has been using Tylenol.   Swelling has improved.  Mild thumb tingling.  History of trigger thumb.      PAST MEDICAL HISTORY:  Past Medical History:   Diagnosis Date     Depressive disorder, not elsewhere classified      Supraspinatus tendonitis 4/1/2011       PAST SURGICAL HISTORY:  Past Surgical History:   Procedure Laterality Date     COLONOSCOPY N/A 03/27/2015    Procedure: COLONOSCOPY;  Surgeon: Brien Lorenzana MD;  Location:  GI     HAND SURGERY Right 1984    right 5th metacarpal repair     RELEASE TRIGGER FINGER Left 12/18/2019    Left Trigger Thumb Release at A[address] [facility], Dr. Unruly Sheriff, [facility]     TONSILLECTOMY  1971       MEDICATIONS:  Current Outpatient Medications   Medication     atorvastatin (LIPITOR) 20 MG tablet     Cholecalciferol (VITAMIN D) 1000 UNIT capsule     finasteride (PROPECIA) 1 MG tablet     Multiple Vitamin (MULTIVITAMINS PO)     sildenafil (VIAGRA) 50 MG tablet     No current facility-administered medications for this visit.       ALLERGIES:   No Known Allergies    FAMILY HISTORY:  No pertinent family  "history    SOCIAL HISTORY:  Social History     Tobacco Use     Smoking status: Never Smoker     Smokeless tobacco: Never Used   Vaping Use     Vaping Use: Never used   Substance Use Topics     Alcohol use: Yes     Alcohol/week: 0.0 standard drinks     Comment: 0-2 beers per week     Drug use: No     The patient's past medical, family, and social history was reviewed and confirmed.    REVIEW OF SYMPTOMS:      General: Negative   Eyes: Negative   Ear, Nose and Throat: Negative   Respiratory: Negative   Cardiovascular: Negative   Gastrointestinal: Negative   Genito-urinary: Negative   Musculoskeletal: Negative  Neurological: Negative   Psychological: Negative  HEME: Negative   ENDO: Negative   SKIN: Negative    VITALS:  Vitals:    09/20/22 0900   Weight: 98 kg (216 lb)   Height: 1.778 m (5' 10\")       EXAM:  General: NAD, A&Ox3  HEENT: NC/AT  CV: RRR by peripheral pulse  Pulmonary: Non-labored breathing on RA    RUE:  Superficial wound over the volar aspect of the right thumb proximal phalanx   Possible early pyogenic granuloma  Dry yellow crust around the edges  No erythema or fluctuance  Mild tenderness of the thumb proximal and distal phalanx  Range of motion is deferred.  EPL and FPL are intact  Sensation is intact to light touch in median, radial, ulnar nerve distributions  Warm well-perfused, capillary refill less than 2 seconds        IMAGING:  X-rays today demonstrate nondisplaced fracture of the proximal phalanx.  There also appears to be a fractured osteophyte off the radial base of the distal phalanx.    I have personally reviewed the above images and labs.         IMPRESSION AND RECOMMENDATIONS:  Mr. Sina Stoner is a 57 year old male right hand dominant with right thumb crush injury, resulting in nondisplaced proximal and distal phalangeal fractures and a superficial volar wound.    Patient should continue wound care. Wash hands with soap and water daily. Continue bacitracin and adaptic dressing changes. " Monitor raw open area, it is possibly an early pyogenic granuloma but it remains flat so will defer any treatment at this time.  10-day course of Keflex renewed and sent to his pharmacy    Removable orthoglass thumb spica splint placed today.  Should wear after dressing changes.    Hand therapy referral placed to obtain a custom thermoplastic thumb spica splint    Follow-up in 4 weeks with x-rays of his right thumb.  Patient voiced understanding and agreement with this plan.  All questions answered.    Larry Brar MD    Hand, Upper Extremity & Microvascular Surgery  Department of Orthopaedic Surgery  [facility]

## 2022-09-20 NOTE — LETTER
9/20/2022         RE: Sina Stoner  [address] [address] [address]        Dear Colleague,    Thank you for referring your patient, Sina Stoner, to the [facility] [place]. Please see a copy of my visit note below.    Orthopaedic Surgery Hand and Upper Extremity Clinic H&P NOTE:  Date: Sep 20, 2022    Patient Name: Sina Stoner  MRN: [id number]    CHIEF COMPLAINT: Right thumb crush injury    Dominant Hand: right  Occupation:       HPI:  Mr. Sina Stoner is a 57 year old male right hand dominant who presents with crush injury to right thumb. Hand got crushed by heavy marble table on 9/9/22 at his cabin. History of trigger thumb.  X-rays at the time demonstrated nondisplaced fractures of the distal and proximal phalanx of the thumb.  He also sustained a superficial wound on the volar aspect of the proximal phalanx near the IP joint.  He was started on Keflex which he has finished.  He states that he continues to ooze yellow crusty fluid.  He has been applying bacitracin to it.  He was given a splint but it is opening so he is just been babying the thumb.  Has been using Tylenol.   Swelling has improved.  Mild thumb tingling.  History of trigger thumb.      PAST MEDICAL HISTORY:  Past Medical History:   Diagnosis Date     Depressive disorder, not elsewhere classified      Supraspinatus tendonitis 4/1/2011       PAST SURGICAL HISTORY:  Past Surgical History:   Procedure Laterality Date     COLONOSCOPY N/A 03/27/2015    Procedure: COLONOSCOPY;  Surgeon: Brien Lorenzana MD;  Location:  GI     HAND SURGERY Right 1984    right 5th metacarpal repair     RELEASE TRIGGER FINGER Left 12/18/2019    Left Trigger Thumb Release at A1 Dr. Unruly Chairez, [facility]     TONSILLECTOMY  1971       MEDICATIONS:  Current Outpatient Medications   Medication     atorvastatin (LIPITOR) 20 MG tablet     Cholecalciferol (VITAMIN D) 1000 UNIT capsule  "    finasteride (PROPECIA) 1 MG tablet     Multiple Vitamin (MULTIVITAMINS PO)     sildenafil (VIAGRA) 50 MG tablet     No current facility-administered medications for this visit.       ALLERGIES:   No Known Allergies    FAMILY HISTORY:  No pertinent family history    SOCIAL HISTORY:  Social History     Tobacco Use     Smoking status: Never Smoker     Smokeless tobacco: Never Used   Vaping Use     Vaping Use: Never used   Substance Use Topics     Alcohol use: Yes     Alcohol/week: 0.0 standard drinks     Comment: 0-2 beers per week     Drug use: No     The patient's past medical, family, and social history was reviewed and confirmed.    REVIEW OF SYMPTOMS:      General: Negative   Eyes: Negative   Ear, Nose and Throat: Negative   Respiratory: Negative   Cardiovascular: Negative   Gastrointestinal: Negative   Genito-urinary: Negative   Musculoskeletal: Negative  Neurological: Negative   Psychological: Negative  HEME: Negative   ENDO: Negative   SKIN: Negative    VITALS:  Vitals:    09/20/22 0900   Weight: 98 kg (216 lb)   Height: 1.778 m (5' 10\")       EXAM:  General: NAD, A&Ox3  HEENT: NC/AT  CV: RRR by peripheral pulse  Pulmonary: Non-labored breathing on RA    RUE:  Superficial wound over the volar aspect of the right thumb proximal phalanx   Possible early pyogenic granuloma  Dry yellow crust around the edges  No erythema or fluctuance  Mild tenderness of the thumb proximal and distal phalanx  Range of motion is deferred.  EPL and FPL are intact  Sensation is intact to light touch in median, radial, ulnar nerve distributions  Warm well-perfused, capillary refill less than 2 seconds        IMAGING:  X-rays today demonstrate nondisplaced fracture of the proximal phalanx.  There also appears to be a fractured osteophyte off the radial base of the distal phalanx.    I have personally reviewed the above images and labs.         IMPRESSION AND RECOMMENDATIONS:  Mr. Sina Stoner is a 57 year old male right hand " dominant with right thumb crush injury, resulting in nondisplaced proximal and distal phalangeal fractures and a superficial volar wound.    Patient should continue wound care. Wash hands with soap and water daily. Continue bacitracin and adaptic dressing changes. Monitor raw open area, it is possibly an early pyogenic granuloma but it remains flat so will defer any treatment at this time.  10-day course of Keflex renewed and sent to his pharmacy    Removable orthoglass thumb spica splint placed today.  Should wear after dressing changes.    Hand therapy referral placed to obtain a custom thermoplastic thumb spica splint    Follow-up in 4 weeks with x-rays of his right thumb.  Patient voiced understanding and agreement with this plan.  All questions answered.    Larry Brar MD    Hand, Upper Extremity & Microvascular Surgery  Department of Orthopaedic Surgery  [facility]      Cast/splint application    Date/Time: 9/20/2022 10:02 AM  Performed by: Siri Ya [facility]  Authorized by: Larry Brar MD     Procedure details:     Laterality:  Right    Splint type: thumb spica.    Supplies used: orthoglass.  Post-procedure details:     Patient tolerance of procedure:  Tolerated well, no immediate complications          Again, thank you for allowing me to participate in the care of your patient.        Sincerely,        Larry Brar MD

## 2022-09-20 NOTE — PROGRESS NOTES
Cast/splint application    Date/Time: 9/20/2022 10:02 AM  Performed by: Siri Ya [facility]  Authorized by: Larry Brar MD     Procedure details:     Laterality:  Right    Splint type: thumb spica.    Supplies used: orthoglass.  Post-procedure details:     Patient tolerance of procedure:  Tolerated well, no immediate complications

## 2022-09-21 ENCOUNTER — THERAPY VISIT (OUTPATIENT)
Dept: OCCUPATIONAL THERAPY | Facility: CLINIC | Age: 57
End: 2022-09-21
Payer: COMMERCIAL

## 2022-09-21 DIAGNOSIS — S69.91XA INJURY OF RIGHT THUMB, INITIAL ENCOUNTER: ICD-10-CM

## 2022-09-21 PROCEDURE — 97760 ORTHOTIC MGMT&TRAING 1ST ENC: CPT | Mod: GO | Performed by: OCCUPATIONAL THERAPIST

## 2022-09-21 PROCEDURE — 97165 OT EVAL LOW COMPLEX 30 MIN: CPT | Mod: GO | Performed by: OCCUPATIONAL THERAPIST

## 2022-09-21 NOTE — PROGRESS NOTES
Hand Therapy Initial Evaluation  Current Date:  9/21/2022    Subjective:  Sina Stoner is a 57 year old right hand dominant male.    Diagnosis: R thumb non displaced fractures of distal and proximal phalanx  DOI:  9/9/22    Patient reports symptoms of pain, stiffness/loss of motion, weakness/loss of strength, edema, numbness and tingling of the right thumb which occurred due to trying to stop a marble table from falling over. Since onset symptoms are gradually getting better. Special tests:  x-rays.  Previous treatment: bulky dressing/support, antibiotics, wound care. General health as reported by patient is good.  Pertinent medical history includes: high cholesterol.  Medical allergies: none.  Surgical history: orthopedic: Left thumb trigger release.  Medication history: Cholesterol, OTC pain meds, Vitamin D.    Occupational Profile Information:  Current occupation is [employer] Computer [employer] working from home  Currently working in normal job without restrictions  Job Tasks: Computer Work, Prolonged Sitting  Prior functional level:  independent-shared household chores  Barriers include:none  Mobility: No difficulty  Transportation: drives  Leisure activities/hobbies: guitar, pickle ball, hockey    Upper Extremity Functional Index Score:  SCORE:   Column Totals: /80: 38   (A lower score indicates greater disability.)    O:  Pain Level (Scale 0-10):   9/21/2022   At Rest 2/10   With Use 8/10     Pain Description:  Date 9/21/2022   Location R thumb IP joint   Pain Quality Pressure and Tingling   Frequency constant     Pain is worst  daytime or nighttime   Exacerbated by  Occurs randomly   Relieved by cold, NSAIDs, elevation   Progression Gradually improving     Wound: small wound present on the volar aspect of the thumb distal phalanx.  No drainage present.    Edema: mild to moderate edema present, pt reports this has decreased     ROM: contraindicated    Assessment/Plan:  Patient presents with symptoms consistent  with diagnosis of R thumb non displaced fractures of distal and proximal phalanx, with conservative intervention.     Patient's limitations or Problem List includes:  Pain, Decreased ROM/motion, Increased edema, Weakness, Decreased , Decreased pinch, Decreased coordination and Decreased dexterity of the right thumb which interferes with the patient's ability to perform Self Care Tasks (dressing, eating, bathing, hygiene/toileting), Work Tasks, Sleep Patterns, Recreational Activities, Household Chores and Driving  as compared to previous level of function.    Rehab Potential:  Excellent - Return to full activity, no limitations    Patient will benefit from skilled Occupational Therapy to increase stability of thumb and decrease pain and edema to return to previous activity level and resume normal daily tasks and to reach their rehab potential.    Barriers to Learning:  No barrier    Communication Issues:  Patient appears to be able to clearly communicate and understand verbal and written communication and follow directions correctly.    Assessment of Occupational Performance:  5 or more Performance Deficits  Identified Performance Deficits: bathing/showering, toileting, dressing, feeding, hygiene and grooming, driving and community mobility, health management and maintenance, home establishment and management, meal preparation and cleanup, sleep and leisure activities      Clinical Decision Making (Complexity): Low complexity    Treatment Explanation:  The following has been discussed with the patient:  RX ordered/plan of care  Anticipated outcomes  Possible risks and side effects    P: Frequency:  1 X week, once daily  Duration:  1x visit.  Pt to continue HEP independently until sees MD for follow up.  Discharge [facility].    Treatment Plan:  Orthotic Fabrication:  Static and Hand based  Discharge Plan:  Achieve all LTG.  Independent in home treatment program.  Reach maximal therapeutic benefit.    Home Exercise  Program:  Hand based thumb spica orthosis full time

## 2022-09-25 PROBLEM — S69.91XA INJURY OF RIGHT THUMB, INITIAL ENCOUNTER: Status: ACTIVE | Noted: 2022-09-25

## 2022-09-26 PROBLEM — S69.91XA INJURY OF RIGHT THUMB, INITIAL ENCOUNTER: Status: RESOLVED | Noted: 2022-09-25 | Resolved: 2022-09-26

## 2022-10-16 ENCOUNTER — HEALTH MAINTENANCE LETTER (OUTPATIENT)
Age: 57
End: 2022-10-16

## 2022-10-17 NOTE — PROGRESS NOTES
Orthopaedic Surgery Hand and Upper Extremity Clinic Progress Note  Date: Oct 18, 2022    Patient Name: Sina Stoner  MRN: [id number]    CHIEF COMPLAINT: Right thumb crush injury    Dominant Hand: right  Occupation:       HPI:    10/18/22: Patient was last seen 9/20/22. Since last visit patient reports some tingling and swelling. He has some sensitivity over the scar which has fully healed. He obtained a custom splint from OT and has been wearing this consistently. Pain has improved.    Mr. Sina Stoner is a 57 year old male right hand dominant who presents with crush injury to right thumb. Hand got crushed by heavy marble table on 9/9/22 at his cabin. History of trigger thumb.  X-rays at the time demonstrated nondisplaced fractures of the distal and proximal phalanx of the thumb.  He also sustained a superficial wound on the volar aspect of the proximal phalanx near the IP joint.  He was started on Keflex which he has finished.  He states that he continues to ooze yellow crusty fluid.  He has been applying bacitracin to it.  He was given a splint but it is opening so he is just been babying the thumb.  Has been using Tylenol.   Swelling has improved.  Mild thumb tingling.  History of trigger thumb.      VITALS:  There were no vitals filed for this visit.    EXAM:  General: NAD, A&Ox3  HEENT: NC/AT  CV: RRR by peripheral pulse  Pulmonary: Non-labored breathing on RA    RUE:  Superficial wound over the volar aspect of the right thumb proximal phalanx fully healed  No erythema or fluctuance  No tenderness of the thumb proximal and distal phalanx    EPL and FPL are intact, IP joint is quite stiff  Sensation is intact to light touch in median, radial, ulnar nerve distributions  Warm well-perfused, capillary refill less than 2 seconds        IMAGING:  X-rays today demonstrate nondisplaced fracture of the proximal phalanx with interval healing. There also appears to be a fractured osteophyte off the  radial base of the distal phalanx.    I have personally reviewed the above images and labs.         IMPRESSION AND RECOMMENDATIONS:  Mr. Sina Stoner is a 57 year old male right hand dominant with right thumb crush injury, resulting in nondisplaced proximal and distal phalangeal fractures and a superficial volar wound, all of which have healed.    He may begin progressive weight bearing as tolerated.    He is to see hand therapy to begin ROM to work out the stiffness.    He will let me know if any problems. Otherwise follow-up as needed.    Larry Brar MD    Hand, Upper Extremity & Microvascular Surgery  Department of Orthopaedic Surgery  [facility]

## 2022-10-18 ENCOUNTER — THERAPY VISIT (OUTPATIENT)
Dept: OCCUPATIONAL THERAPY | Facility: CLINIC | Age: 57
End: 2022-10-18
Payer: COMMERCIAL

## 2022-10-18 ENCOUNTER — OFFICE VISIT (OUTPATIENT)
Dept: ORTHOPEDICS | Facility: CLINIC | Age: 57
End: 2022-10-18
Payer: COMMERCIAL

## 2022-10-18 ENCOUNTER — ANCILLARY PROCEDURE (OUTPATIENT)
Dept: GENERAL RADIOLOGY | Facility: CLINIC | Age: 57
End: 2022-10-18
Attending: STUDENT IN AN ORGANIZED HEALTH CARE EDUCATION/TRAINING PROGRAM
Payer: COMMERCIAL

## 2022-10-18 VITALS
HEIGHT: 70 IN | SYSTOLIC BLOOD PRESSURE: 122 MMHG | DIASTOLIC BLOOD PRESSURE: 78 MMHG | BODY MASS INDEX: 31.5 KG/M2 | WEIGHT: 220 LBS

## 2022-10-18 DIAGNOSIS — S69.91XA INJURY OF RIGHT THUMB, INITIAL ENCOUNTER: Primary | ICD-10-CM

## 2022-10-18 DIAGNOSIS — M79.644 PAIN OF RIGHT THUMB: ICD-10-CM

## 2022-10-18 DIAGNOSIS — S69.91XA INJURY OF RIGHT THUMB, INITIAL ENCOUNTER: ICD-10-CM

## 2022-10-18 PROCEDURE — 99213 OFFICE O/P EST LOW 20 MIN: CPT | Performed by: STUDENT IN AN ORGANIZED HEALTH CARE EDUCATION/TRAINING PROGRAM

## 2022-10-18 PROCEDURE — 97110 THERAPEUTIC EXERCISES: CPT | Mod: GO | Performed by: OCCUPATIONAL THERAPIST

## 2022-10-18 PROCEDURE — 73140 X-RAY EXAM OF FINGER(S): CPT | Mod: TC | Performed by: RADIOLOGY

## 2022-10-18 PROCEDURE — 97140 MANUAL THERAPY 1/> REGIONS: CPT | Mod: GO | Performed by: OCCUPATIONAL THERAPIST

## 2022-10-18 ASSESSMENT — PAIN SCALES - GENERAL: PAINLEVEL: NO PAIN (1)

## 2022-10-18 NOTE — LETTER
10/18/2022         RE: Sina Stoner  [address] [address] [address]        Dear Colleague,    Thank you for referring your patient, Sina Stoner, to the [facility] [place]. Please see a copy of my visit note below.    Orthopaedic Surgery Hand and Upper Extremity Clinic Progress Note  Date: Oct 18, 2022    Patient Name: Sina Stoner  MRN: [id number]    CHIEF COMPLAINT: Right thumb crush injury    Dominant Hand: right  Occupation:       HPI:    10/18/22: Patient was last seen 9/20/22. Since last visit patient reports some tingling and swelling. He has some sensitivity over the scar which has fully healed. He obtained a custom splint from OT and has been wearing this consistently. Pain has improved.    Mr. Sina Stoner is a 57 year old male right hand dominant who presents with crush injury to right thumb. Hand got crushed by heavy marble table on 9/9/22 at his cabin. History of trigger thumb.  X-rays at the time demonstrated nondisplaced fractures of the distal and proximal phalanx of the thumb.  He also sustained a superficial wound on the volar aspect of the proximal phalanx near the IP joint.  He was started on Keflex which he has finished.  He states that he continues to ooze yellow crusty fluid.  He has been applying bacitracin to it.  He was given a splint but it is opening so he is just been babying the thumb.  Has been using Tylenol.   Swelling has improved.  Mild thumb tingling.  History of trigger thumb.      VITALS:  There were no vitals filed for this visit.    EXAM:  General: NAD, A&Ox3  HEENT: NC/AT  CV: RRR by peripheral pulse  Pulmonary: Non-labored breathing on RA    RUE:  Superficial wound over the volar aspect of the right thumb proximal phalanx fully healed  No erythema or fluctuance  No tenderness of the thumb proximal and distal phalanx    EPL and FPL are intact, IP joint is quite stiff  Sensation is intact to light  touch in median, radial, ulnar nerve distributions  Warm well-perfused, capillary refill less than 2 seconds        IMAGING:  X-rays today demonstrate nondisplaced fracture of the proximal phalanx with interval healing. There also appears to be a fractured osteophyte off the radial base of the distal phalanx.    I have personally reviewed the above images and labs.         IMPRESSION AND RECOMMENDATIONS:  Mr. Sina Stoner is a 57 year old male right hand dominant with right thumb crush injury, resulting in nondisplaced proximal and distal phalangeal fractures and a superficial volar wound, all of which have healed.    He may begin progressive weight bearing as tolerated.    He is to see hand therapy to begin ROM to work out the stiffness.    He will let me know if any problems. Otherwise follow-up as needed.    Larry Brar MD    Hand, Upper Extremity & Microvascular Surgery  Department of Orthopaedic Surgery  [facility]        Again, thank you for allowing me to participate in the care of your patient.        Sincerely,        Larry Brar MD

## 2022-10-18 NOTE — PROGRESS NOTES
Hand Therapy Progress Note  Current Date:  10/18/22  Reporting Period: 9/21/2022 to 10/18/2022    Subjective:  Sina Stoner is a 57 year old right hand dominant male.    Diagnosis: R thumb non displaced fractures of distal and proximal phalanx  DOI:  9/9/22    S:  Subjective changes as noted by patient: I saw the doctor.  I don't need to wear the splint anymore.  The tip is stiff.     Functional changes noted by patient: hasn't used much due to splint wear     Response to previous treatment:  good  Patient has noted adverse reaction to:   None      O:  Pain Level (Scale 0-10):   9/21/2022   At Rest 2/10   With Use 8/10     Pain Description:  Date 9/21/2022   Location R thumb IP joint   Pain Quality Pressure and Tingling   Frequency constant     Pain is worst  daytime or nighttime   Exacerbated by  Occurs randomly   Relieved by cold, NSAIDs, elevation   Progression Gradually improving     Wound: small wound present on the volar aspect of the thumb distal phalanx.  No drainage present.    Edema: mild to moderate edema present, pt reports this has decreased     ROM  Thumb 10/18/2022 10/18/2022   AROM  (PROM) L R   MP -22/67 -20/64   IP +13/72 +12/5   RABD 43 47   PABD 45 48   Kapandji Opposition Scale (0-10/10) 10 7     Assessment:  Response to therapy has been improvement to:  ROM of Thumb:  All Planes    Overall Assessment:  Patient's symptoms are resolving.  Patient is ready to progress to more complex exercises.  Patient is ready to progress to next level of protocol.  Patient is becoming more independent in home exercise program  Patient would benefit from continued therapy to achieve rehab potential  STG/LTG:  STGoals have been reviewed;  see goal sheet for details and updates.  LTGoals have been reviewed;  see goal sheet for details and updates.    I have re-evaluated this patient and find that the nature, scope, duration and intensity of the therapy is appropriate for the medical condition of the  patient.    P: Frequency:  1 X week, once daily  Duration:  6 weeks    Treatment Plan:    Modalities:    US and Paraffin   Therapeutic Exercise:    AROM, AAROM, PROM, Tendon Gliding, Blocking, Place and Hold, Contract Relax, Isotonics and Isometrics  Therapeutic Activities:   Functional activities   Neuromuscular re-ed:   Nerve Gliding, Sensory re-education and Proprioceptive Training  Manual Techniques:   Coordination/Dexterity, Joint mobilization, Scar mobilization, Myofascial release and Manual edema mobilization    Discharge Plan:  Achieve all LTG.  Independent in home treatment program.  Reach maximal therapeutic benefit.    Home Exercise Program:  Scar mobilization  Blocking  A/PROM of thumb

## 2022-10-24 ENCOUNTER — THERAPY VISIT (OUTPATIENT)
Dept: OCCUPATIONAL THERAPY | Facility: CLINIC | Age: 57
End: 2022-10-24
Payer: COMMERCIAL

## 2022-10-24 DIAGNOSIS — S69.91XA INJURY OF RIGHT THUMB, INITIAL ENCOUNTER: Primary | ICD-10-CM

## 2022-10-24 DIAGNOSIS — M79.644 PAIN OF RIGHT THUMB: ICD-10-CM

## 2022-10-24 PROCEDURE — 97140 MANUAL THERAPY 1/> REGIONS: CPT | Mod: GO | Performed by: OCCUPATIONAL THERAPIST

## 2022-10-24 PROCEDURE — 97110 THERAPEUTIC EXERCISES: CPT | Mod: GO | Performed by: OCCUPATIONAL THERAPIST

## 2022-10-24 PROCEDURE — 97112 NEUROMUSCULAR REEDUCATION: CPT | Mod: GO | Performed by: OCCUPATIONAL THERAPIST

## 2022-10-24 NOTE — PROGRESS NOTES
SOAP Note objective information for 10/24/2022      ROM  Thumb 10/18/2022 10/18/2022 10/24/22   AROM  (PROM) L R R   MP -22/67 -20/64 -14/68   IP +13/72 +12/5 -12/7   RABD 43 47 NT   PABD 45 48 NT   Kapandji Opposition Scale (0-10/10) 10 7 NT   Please refer to the daily flowsheet for treatment today, total treatment time and time spent performing 1:1 timed codes.

## 2022-11-03 ENCOUNTER — THERAPY VISIT (OUTPATIENT)
Dept: OCCUPATIONAL THERAPY | Facility: CLINIC | Age: 57
End: 2022-11-03
Payer: COMMERCIAL

## 2022-11-03 DIAGNOSIS — S69.91XA INJURY OF RIGHT THUMB, INITIAL ENCOUNTER: Primary | ICD-10-CM

## 2022-11-03 DIAGNOSIS — M79.644 PAIN OF RIGHT THUMB: ICD-10-CM

## 2022-11-03 PROCEDURE — 97112 NEUROMUSCULAR REEDUCATION: CPT | Mod: GO | Performed by: OCCUPATIONAL THERAPIST

## 2022-11-03 PROCEDURE — 97140 MANUAL THERAPY 1/> REGIONS: CPT | Mod: GO | Performed by: OCCUPATIONAL THERAPIST

## 2022-11-03 PROCEDURE — 97110 THERAPEUTIC EXERCISES: CPT | Mod: GO | Performed by: OCCUPATIONAL THERAPIST

## 2022-11-03 NOTE — PROGRESS NOTES
SOAP Note objective information for 11/3/2022    ROM  Thumb 10/18/2022 10/18/2022 10/24/22 11/3/22   AROM  (PROM) L R R R   MP -22/67 -20/64 -14/68 -20/65   IP +13/72 +12/5 +12/7 +/10   RABD 43 47 NT NT   PABD 45 48 NT NT   Kapandji Opposition Scale (0-10/10) 10 7 NT NT   Please refer to the daily flowsheet for treatment today, total treatment time and time spent performing 1:1 timed codes.

## 2022-11-11 ENCOUNTER — THERAPY VISIT (OUTPATIENT)
Dept: OCCUPATIONAL THERAPY | Facility: CLINIC | Age: 57
End: 2022-11-11
Payer: COMMERCIAL

## 2022-11-11 DIAGNOSIS — S69.91XA INJURY OF RIGHT THUMB, INITIAL ENCOUNTER: Primary | ICD-10-CM

## 2022-11-11 DIAGNOSIS — M79.644 PAIN OF RIGHT THUMB: ICD-10-CM

## 2022-11-11 PROCEDURE — 97110 THERAPEUTIC EXERCISES: CPT | Mod: GO | Performed by: OCCUPATIONAL THERAPIST

## 2022-11-11 PROCEDURE — 97035 APP MDLTY 1+ULTRASOUND EA 15: CPT | Mod: GO | Performed by: OCCUPATIONAL THERAPIST

## 2022-11-11 NOTE — PROGRESS NOTES
SOAP Note objective information for 11/11/2022    ROM  Thumb 10/18/2022 10/18/2022 10/24/22 11/3/22 11/11/22   AROM  (PROM) L R R R R   MP -22/67 -20/64 -14/68 -20/65 -15/68   IP +13/72 +12/5 +12/7 +/10 +/11   RABD 43 47 NT NT NT   PABD 45 48 NT NT NT   Kapandji Opposition Scale (0-10/10) 10 7 NT NT NT   Please refer to the daily flowsheet for treatment today, total treatment time and time spent performing 1:1 timed codes.

## 2022-11-18 ENCOUNTER — THERAPY VISIT (OUTPATIENT)
Dept: OCCUPATIONAL THERAPY | Facility: CLINIC | Age: 57
End: 2022-11-18
Payer: COMMERCIAL

## 2022-11-18 DIAGNOSIS — M79.644 PAIN OF RIGHT THUMB: ICD-10-CM

## 2022-11-18 DIAGNOSIS — S69.91XA INJURY OF RIGHT THUMB, INITIAL ENCOUNTER: Primary | ICD-10-CM

## 2022-11-18 PROCEDURE — 97035 APP MDLTY 1+ULTRASOUND EA 15: CPT | Mod: GO | Performed by: OCCUPATIONAL THERAPIST

## 2022-11-18 PROCEDURE — 97140 MANUAL THERAPY 1/> REGIONS: CPT | Mod: GO | Performed by: OCCUPATIONAL THERAPIST

## 2022-11-18 PROCEDURE — 97110 THERAPEUTIC EXERCISES: CPT | Mod: GO | Performed by: OCCUPATIONAL THERAPIST

## 2022-11-18 NOTE — PROGRESS NOTES
SOAP Note objective information for 11/18/2022    ROM  Thumb 10/18/2022 10/18/2022 10/24/22 11/3/22 11/11/22 11/18/22   AROM  (PROM) L R R R R R   MP -22/67 -20/64 -14/68 -20/65 -15/68 -20/67   IP +13/72 +12/5 +12/7 +/10 +/11 +/12   RABD 43 47 NT NT NT NT   PABD 45 48 NT NT NT NT   Kapandji Opposition Scale (0-10/10) 10 7 NT NT NT NT   Please refer to the daily flowsheet for treatment today, total treatment time and time spent performing 1:1 timed codes.

## 2022-11-25 ENCOUNTER — THERAPY VISIT (OUTPATIENT)
Dept: OCCUPATIONAL THERAPY | Facility: CLINIC | Age: 57
End: 2022-11-25
Payer: COMMERCIAL

## 2022-11-25 DIAGNOSIS — S69.91XA INJURY OF RIGHT THUMB, INITIAL ENCOUNTER: Primary | ICD-10-CM

## 2022-11-25 DIAGNOSIS — M79.644 PAIN OF RIGHT THUMB: ICD-10-CM

## 2022-11-25 PROCEDURE — 97140 MANUAL THERAPY 1/> REGIONS: CPT | Mod: GO | Performed by: OCCUPATIONAL THERAPIST

## 2022-11-25 PROCEDURE — 97110 THERAPEUTIC EXERCISES: CPT | Mod: GO | Performed by: OCCUPATIONAL THERAPIST

## 2022-11-25 PROCEDURE — 97035 APP MDLTY 1+ULTRASOUND EA 15: CPT | Mod: GO | Performed by: OCCUPATIONAL THERAPIST

## 2022-11-25 NOTE — PROGRESS NOTES
SOAP Note objective information for 11/25/2022    ROM  Thumb 10/18/22 10/18/22 10/24/22 11/3/22 11/11/22 11/18/22 11/25/22   AROM  (PROM) L R R R R R R   MP -22/67 -20/64 -14/68 -20/65 -15/68 -20/67 -20/NT   IP +13/72 +12/5 +12/7 +/10 +/11 +/12 +/13   RABD 43 47 NT NT NT NT NT   PABD 45 48 NT NT NT NT NT   Kapandji Opposition Scale (0-10/10) 10 7 NT NT NT NT NT   Please refer to the daily flowsheet for treatment today, total treatment time and time spent performing 1:1 timed codes.

## 2022-11-29 ENCOUNTER — THERAPY VISIT (OUTPATIENT)
Dept: OCCUPATIONAL THERAPY | Facility: CLINIC | Age: 57
End: 2022-11-29
Payer: COMMERCIAL

## 2022-11-29 DIAGNOSIS — S69.91XA INJURY OF RIGHT THUMB, INITIAL ENCOUNTER: Primary | ICD-10-CM

## 2022-11-29 DIAGNOSIS — M79.644 PAIN OF RIGHT THUMB: ICD-10-CM

## 2022-11-29 PROCEDURE — 97035 APP MDLTY 1+ULTRASOUND EA 15: CPT | Mod: GO | Performed by: OCCUPATIONAL THERAPIST

## 2022-11-29 PROCEDURE — 97140 MANUAL THERAPY 1/> REGIONS: CPT | Mod: GO | Performed by: OCCUPATIONAL THERAPIST

## 2022-11-29 PROCEDURE — 97110 THERAPEUTIC EXERCISES: CPT | Mod: GO | Performed by: OCCUPATIONAL THERAPIST

## 2022-11-29 NOTE — PROGRESS NOTES
Hand Therapy Initial Evaluation  Current Date:  11/29/2022  Reporting Period: 9/21/22 to 11/29/2022    Subjective:  Sina Stoner is a 57 year old right hand dominant male.    Diagnosis: R thumb non displaced fractures of distal and proximal phalanx  DOI:  9/9/22    S:  Subjective changes as noted by patient: It's doing fine.  I think it looks like it's bending more.  It swells up overnight.      Functional changes noted by patient: trying to use it as able.     Response to previous treatment:  good  Patient has noted adverse reaction to:   None      O:  Pain Level (Scale 0-10):   9/21/2022 11/29/22   At Rest 2/10 1/10   With Use 8/10 6/10     Pain Description:  Date 9/21/2022 11/29/22   Location R thumb IP joint R thumb IP joint   Pain Quality Pressure and Tingling pressure   Frequency constant   constant   Pain is worst  daytime or nighttime daytime   Exacerbated by  Occurs randomly Pinching/gripping   Relieved by cold, NSAIDs, elevation Cold, elevation   Progression Gradually improving Gradually improving     Wound: small wound present on the volar aspect of the thumb distal phalanx.  No drainage present.    Edema: mild to moderate edema present, pt reports this has decreased     ROM  Thumb 10/18/22 10/18/22 11/18/22 11/25/22 11/29/22   AROM  (PROM) L R R R R   MP -22/67 -20/64 -20/67 -20/NT -20/70   IP +13/72 +12/5 +/12 +/13 +/13   RABD 43 47 NT NT NT   PABD 45 48 NT NT NT   Kapandji Opposition Scale (0-10/10) 10 7 NT NT NT     Assessment:  Response to therapy has been improvement to:  ROM of Thumb:  Ext  and Flex  Flexibility:  tendon gliding is improved, improved excursion of involved muscles and less tightness in involved muscles  Pain:  frequency is less, intensity of pain is decreased, duration of pain is decreased and less tender over affected area    Overall Assessment:  Patient's symptoms are resolving.  Patient is ready to progress to more complex exercises.  Patient is ready to progress to next level  of protocol.  Patient is becoming more independent in home exercise program  Patient would benefit from continued therapy to achieve rehab potential  STG/LTG:  STGoals have been reviewed;  see goal sheet for details and updates.  LTGoals have been reviewed;  see goal sheet for details and updates.    I have re-evaluated this patient and find that the nature, scope, duration and intensity of the therapy is appropriate for the medical condition of the patient.    P: Frequency:  1 X week, once daily  Duration:  for 6 weeks    Treatment Plan:  Modalities:  US and Paraffin  Therapeutic Exercise:  AROM, AAROM, PROM, Tendon Gliding, Blocking, Reverse Blocking, Place and Hold, Isotonics and Isometrics  Neuromuscular re-education:  Nerve Gliding, Coordination/Dexterity, Kinesthetic Training, Proprioceptive Training and Isometrics  Manual Techniques:  Coordination/Dexterity, Joint mobilization, Scar mobilization, Myofascial release and Manual edema mobilization  Orthotic Fabrication:  Static, Static progressive and Hand based  Discharge Plan:  Achieve all LTG.  Independent in home treatment program.  Reach maximal therapeutic benefit.    Home Exercise Program:  A/PROM  Scar mobilization  Increase use  Flexion Taping    Next Visit:  US  Scar mobilization  A/PROM

## 2022-12-09 ENCOUNTER — THERAPY VISIT (OUTPATIENT)
Dept: OCCUPATIONAL THERAPY | Facility: CLINIC | Age: 57
End: 2022-12-09
Payer: COMMERCIAL

## 2022-12-09 DIAGNOSIS — S69.91XA INJURY OF RIGHT THUMB, INITIAL ENCOUNTER: Primary | ICD-10-CM

## 2022-12-09 DIAGNOSIS — M79.644 PAIN OF RIGHT THUMB: ICD-10-CM

## 2022-12-09 PROCEDURE — 97035 APP MDLTY 1+ULTRASOUND EA 15: CPT | Mod: GO | Performed by: OCCUPATIONAL THERAPIST

## 2022-12-09 PROCEDURE — 97140 MANUAL THERAPY 1/> REGIONS: CPT | Mod: GO | Performed by: OCCUPATIONAL THERAPIST

## 2022-12-09 PROCEDURE — 97110 THERAPEUTIC EXERCISES: CPT | Mod: GO | Performed by: OCCUPATIONAL THERAPIST

## 2022-12-09 NOTE — PROGRESS NOTES
SOAP Note objective information for 12/9/2022    ROM  Thumb 10/18/22 10/18/22 11/18/22 11/25/22 11/29/22    AROM  (PROM) L R R R R R   MP -22/67 -20/64 -20/67 -20/NT -20/70 NT   IP +13/72 +12/5 +/12 +/13 +/13 +/15 (/21 after treatment)   RABD 43 47 NT NT NT NT   PABD 45 48 NT NT NT NT   Kapandji Opposition Scale (0-10/10) 10 7 NT NT NT NT   Please refer to the daily flowsheet for treatment today, total treatment time and time spent performing 1:1 timed codes.

## 2022-12-16 ENCOUNTER — THERAPY VISIT (OUTPATIENT)
Dept: OCCUPATIONAL THERAPY | Facility: CLINIC | Age: 57
End: 2022-12-16
Payer: COMMERCIAL

## 2022-12-16 DIAGNOSIS — M79.644 PAIN OF RIGHT THUMB: ICD-10-CM

## 2022-12-16 DIAGNOSIS — S69.91XA INJURY OF RIGHT THUMB, INITIAL ENCOUNTER: Primary | ICD-10-CM

## 2022-12-16 PROCEDURE — 97110 THERAPEUTIC EXERCISES: CPT | Mod: GO | Performed by: OCCUPATIONAL THERAPIST

## 2022-12-16 PROCEDURE — 97140 MANUAL THERAPY 1/> REGIONS: CPT | Mod: GO | Performed by: OCCUPATIONAL THERAPIST

## 2022-12-16 PROCEDURE — 97035 APP MDLTY 1+ULTRASOUND EA 15: CPT | Mod: GO | Performed by: OCCUPATIONAL THERAPIST

## 2022-12-23 ENCOUNTER — THERAPY VISIT (OUTPATIENT)
Dept: OCCUPATIONAL THERAPY | Facility: CLINIC | Age: 57
End: 2022-12-23
Payer: COMMERCIAL

## 2022-12-23 DIAGNOSIS — S69.91XA INJURY OF RIGHT THUMB, INITIAL ENCOUNTER: Primary | ICD-10-CM

## 2022-12-23 DIAGNOSIS — M79.644 PAIN OF RIGHT THUMB: ICD-10-CM

## 2022-12-23 PROCEDURE — 97140 MANUAL THERAPY 1/> REGIONS: CPT | Mod: GO | Performed by: OCCUPATIONAL THERAPIST

## 2022-12-23 PROCEDURE — 97035 APP MDLTY 1+ULTRASOUND EA 15: CPT | Mod: GO | Performed by: OCCUPATIONAL THERAPIST

## 2022-12-23 PROCEDURE — 97110 THERAPEUTIC EXERCISES: CPT | Mod: GO | Performed by: OCCUPATIONAL THERAPIST

## 2023-01-03 ENCOUNTER — THERAPY VISIT (OUTPATIENT)
Dept: OCCUPATIONAL THERAPY | Facility: CLINIC | Age: 58
End: 2023-01-03
Payer: COMMERCIAL

## 2023-01-03 DIAGNOSIS — M79.644 PAIN OF RIGHT THUMB: ICD-10-CM

## 2023-01-03 DIAGNOSIS — S69.91XA INJURY OF RIGHT THUMB, INITIAL ENCOUNTER: Primary | ICD-10-CM

## 2023-01-03 PROCEDURE — 97035 APP MDLTY 1+ULTRASOUND EA 15: CPT | Mod: GO | Performed by: OCCUPATIONAL THERAPIST

## 2023-01-03 PROCEDURE — 97110 THERAPEUTIC EXERCISES: CPT | Mod: GO | Performed by: OCCUPATIONAL THERAPIST

## 2023-01-03 PROCEDURE — 97140 MANUAL THERAPY 1/> REGIONS: CPT | Mod: GO | Performed by: OCCUPATIONAL THERAPIST

## 2023-01-03 NOTE — PROGRESS NOTES
SOAP Note objective information for 1/3/23    ROM  Thumb 10/18/22 10/18/22 11/18/22 11/25/22 11/29/22 12/9/22 1/3/23   AROM  (PROM) L R R R R R R   MP -22/67 -20/64 -20/67 -20/NT -20/70 NT NT   IP +13/72 +12/5 +/12 +/13 +/13 +/15 (/21 after treatment) 14 (30)  20 after treatment   RABD 43 47 NT NT NT NT NT   PABD 45 48 NT NT NT NT NT   Kapandji Opposition Scale (0-10/10) 10 7 NT NT NT NT NT   Please refer to the daily flowsheet for treatment today, total treatment time and time spent performing 1:1 timed codes.

## 2023-01-24 ENCOUNTER — THERAPY VISIT (OUTPATIENT)
Dept: OCCUPATIONAL THERAPY | Facility: CLINIC | Age: 58
End: 2023-01-24
Payer: COMMERCIAL

## 2023-01-24 DIAGNOSIS — S69.91XA INJURY OF RIGHT THUMB, INITIAL ENCOUNTER: Primary | ICD-10-CM

## 2023-01-24 DIAGNOSIS — M79.644 PAIN OF RIGHT THUMB: ICD-10-CM

## 2023-01-24 PROCEDURE — 97110 THERAPEUTIC EXERCISES: CPT | Mod: GO | Performed by: OCCUPATIONAL THERAPIST

## 2023-01-24 PROCEDURE — 97035 APP MDLTY 1+ULTRASOUND EA 15: CPT | Mod: GO | Performed by: OCCUPATIONAL THERAPIST

## 2023-01-24 PROCEDURE — 97140 MANUAL THERAPY 1/> REGIONS: CPT | Mod: GO | Performed by: OCCUPATIONAL THERAPIST

## 2023-01-24 NOTE — PROGRESS NOTES
SOAP Note objective information for 1/24/23    ROM  Thumb 10/18/22 10/18/22 11/18/22 11/25/22 11/29/22 12/9/22 1/3/23 1/24   AROM  (PROM) L R R R R R R R   MP -22/67 -20/64 -20/67 -20/NT -20/70 NT NT NT   IP +13/72 +12/5 +/12 +/13 +/13 +/15 (/21 after treatment) 14 (30)  20 after treatment 14   RABD 43 47 NT NT NT NT NT NT   PABD 45 48 NT NT NT NT NT NT   Kapandji Opposition Scale (0-10/10) 10 7 NT NT NT NT NT NT   Please refer to the daily flowsheet for treatment today, total treatment time and time spent performing 1:1 timed codes.

## 2023-01-31 ENCOUNTER — THERAPY VISIT (OUTPATIENT)
Dept: OCCUPATIONAL THERAPY | Facility: CLINIC | Age: 58
End: 2023-01-31
Payer: COMMERCIAL

## 2023-01-31 DIAGNOSIS — M79.644 PAIN OF RIGHT THUMB: ICD-10-CM

## 2023-01-31 DIAGNOSIS — S69.91XA INJURY OF RIGHT THUMB, INITIAL ENCOUNTER: Primary | ICD-10-CM

## 2023-01-31 PROCEDURE — 97110 THERAPEUTIC EXERCISES: CPT | Mod: GO | Performed by: OCCUPATIONAL THERAPIST

## 2023-01-31 PROCEDURE — 97140 MANUAL THERAPY 1/> REGIONS: CPT | Mod: GO | Performed by: OCCUPATIONAL THERAPIST

## 2023-01-31 NOTE — PROGRESS NOTES
Hand Therapy Progress Note  Current Date:  1/31/23  Reporting Period: 11/29/22 to 1/31/23  Subjective:  Sina Stoner is a 57 year old right hand dominant male.    Diagnosis: R thumb non displaced fractures of distal and proximal phalanx  DOI:  9/9/22    S:  Subjective changes as noted by patient: Good.  I still think it's loosening up.    Functional changes noted by patient: I can do most things.  When it comes to plastic cellophane, it hurts.   Response to previous treatment:  good  Patient has noted adverse reaction to:   None      O:  Pain Level (Scale 0-10):   9/21/2022 11/29/22 1/31/22   At Rest 2/10 1/10 0/10   With Use 8/10 6/10 2/10     Pain Description:  Date 9/21/2022 11/29/22 1/31/23   Location R thumb IP joint R thumb IP joint R thumb IP joint   Pain Quality Pressure and Tingling pressure Sharp   Frequency constant   constant intermittent   Pain is worst  daytime or nighttime daytime Daytime   Exacerbated by  Occurs randomly Pinching/gripping Pressure--pushing, opening car door   Relieved by cold, NSAIDs, elevation Cold, elevation Sudden relief--quick pain   Progression Gradually improving Gradually improving Gradually improving     Wound: small wound present on the volar aspect of the thumb distal phalanx.  No drainage present. 1/31/23: scar, moderate to significant adherence at the level of the IP, particularly to the radial aspect     Edema: mild to moderate edema present, pt reports this tends to improve with scar mobilization    ROM  Thumb 10/18/22 10/18/22 11/18/22 11/25/22 11/29/22 1/31/23   AROM  (PROM) L R R R R R   MP -22/67 -20/64 -20/67 -20/NT -20/70 -15/73   IP +13/72 +12/5 +/12 +/13 +/13 +/5* (after treatment, improved to 21 degrees)   RABD 43 47 NT NT NT NT   PABD 45 48 NT NT NT NT   Kapandji Opposition Scale (0-10/10) 10 7 NT NT NT NT   * patient reports feeling more stiff today due to the cold weather    Strength   (Measured in pounds)  Pain Report:  scale of 0-10/10    Lat Pinch  1/31/2023 1/31/2023   Trials L R   1  2  3 21 22   Average 21 22     3 Pt Pinch 1/31/2023 1/31/2023   Trials L R   1  2  3 21 22   Average 21 22     Assessment:  Response to therapy has been improvement to:  ROM of the thumb MCP flexion/extension  Strength:  Pinch strength  Response to therapy has been slow progress in:  ROM of thumb IP flexion    Overall Assessment:  Patient's symptoms are resolving.  Patient is ready to progress to more complex exercises.  Patient is becoming more independent in home exercise program  Patient would benefit from continued therapy to achieve rehab potential  STG/LTG:  STGoals have been reviewed;  see goal sheet for details and updates.  LTGoals have been reviewed;  see goal sheet for details and updates.    I have re-evaluated this patient and find that the nature, scope, duration and intensity of the therapy is appropriate for the medical condition of the patient.    Plan: Frequency:  1 X week, once daily  Duration: for 8 weeks    Treatment Plan:  Modalities:  US and Paraffin  Therapeutic Exercise:  AROM, AAROM, PROM, Tendon Gliding, Blocking, Reverse Blocking, Place and Hold, Isotonics and Isometrics  Neuromuscular re-education:  Nerve Gliding, Coordination/Dexterity, Kinesthetic Training, Proprioceptive Training and Isometrics  Manual Techniques:  Coordination/Dexterity, Joint mobilization, Scar mobilization, Myofascial release and Manual edema mobilization  Orthotic Fabrication:  Static, Static progressive and Hand based  Discharge Plan:  Achieve all LTG.  Independent in home treatment program.  Reach maximal therapeutic benefit.    Home Exercise Program:  A/PROM  Scar mobilization  Increase use  Flexion Taping    Next Visit:  US  Scar mobilization  A/PROM  Static-progressive IP flexion orthosis

## 2023-02-07 ENCOUNTER — THERAPY VISIT (OUTPATIENT)
Dept: OCCUPATIONAL THERAPY | Facility: CLINIC | Age: 58
End: 2023-02-07
Payer: COMMERCIAL

## 2023-02-07 DIAGNOSIS — M79.644 PAIN OF RIGHT THUMB: ICD-10-CM

## 2023-02-07 DIAGNOSIS — S69.91XA INJURY OF RIGHT THUMB, INITIAL ENCOUNTER: Primary | ICD-10-CM

## 2023-02-07 PROCEDURE — 97140 MANUAL THERAPY 1/> REGIONS: CPT | Mod: GO | Performed by: OCCUPATIONAL THERAPIST

## 2023-02-07 PROCEDURE — 97035 APP MDLTY 1+ULTRASOUND EA 15: CPT | Mod: GO | Performed by: OCCUPATIONAL THERAPIST

## 2023-02-07 PROCEDURE — 97110 THERAPEUTIC EXERCISES: CPT | Mod: GO | Performed by: OCCUPATIONAL THERAPIST

## 2023-02-07 NOTE — PROGRESS NOTES
SOAP Note objective information for 2/7/2023    ROM  Thumb 10/18/22 10/18/22 11/18/22 11/25/22 11/29/22 1/31/23 2/7/23   AROM  (PROM) L R R R R R R   MP -22/67 -20/64 -20/67 -20/NT -20/70 -15/73 NT   IP +13/72 +12/5 +/12 +/13 +/13 +/5* (after treatment, improved to 21 degrees) /16 (p tx 23)   RABD 43 47 NT NT NT NT NT   PABD 45 48 NT NT NT NT NT   Kapandji Opposition Scale (0-10/10) 10 7 NT NT NT NT NT   Please refer to the daily flowsheet for treatment today, total treatment time and time spent performing 1:1 timed codes.

## 2023-02-14 ENCOUNTER — THERAPY VISIT (OUTPATIENT)
Dept: OCCUPATIONAL THERAPY | Facility: CLINIC | Age: 58
End: 2023-02-14
Payer: COMMERCIAL

## 2023-02-14 DIAGNOSIS — S69.91XA INJURY OF RIGHT THUMB, INITIAL ENCOUNTER: Primary | ICD-10-CM

## 2023-02-14 DIAGNOSIS — M79.644 PAIN OF RIGHT THUMB: ICD-10-CM

## 2023-02-14 PROCEDURE — 97140 MANUAL THERAPY 1/> REGIONS: CPT | Mod: GO | Performed by: OCCUPATIONAL THERAPIST

## 2023-02-14 PROCEDURE — 97110 THERAPEUTIC EXERCISES: CPT | Mod: GO | Performed by: OCCUPATIONAL THERAPIST

## 2023-02-14 PROCEDURE — 97035 APP MDLTY 1+ULTRASOUND EA 15: CPT | Mod: GO | Performed by: OCCUPATIONAL THERAPIST

## 2023-02-14 NOTE — PROGRESS NOTES
SOAP Note objective information for 2/14/2023    ROM  Thumb 10/18/22 10/18/22 11/18/22 11/25/22 11/29/22 1/31/23 2/7/23 2/14/23   AROM  (PROM) L R R R R R R R   MP -22/67 -20/64 -20/67 -20/NT -20/70 -15/73 NT NT   IP +13/72 +12/5 +/12 +/13 +/13 +/5* (after treatment, improved to 21 degrees) +/16 (p tx 23) +/17   RABD 43 47 NT NT NT NT NT NT   PABD 45 48 NT NT NT NT NT NT   Kapand Opposition Scale (0-10/10) 10 7 NT NT NT NT NT NT   Please refer to the daily flowsheet for treatment today, total treatment time and time spent performing 1:1 timed codes.

## 2023-02-21 ENCOUNTER — THERAPY VISIT (OUTPATIENT)
Dept: OCCUPATIONAL THERAPY | Facility: CLINIC | Age: 58
End: 2023-02-21
Payer: COMMERCIAL

## 2023-02-21 DIAGNOSIS — M79.644 PAIN OF RIGHT THUMB: ICD-10-CM

## 2023-02-21 DIAGNOSIS — S69.91XA INJURY OF RIGHT THUMB, INITIAL ENCOUNTER: Primary | ICD-10-CM

## 2023-02-21 PROCEDURE — 97035 APP MDLTY 1+ULTRASOUND EA 15: CPT | Mod: GO | Performed by: OCCUPATIONAL THERAPIST

## 2023-02-21 PROCEDURE — 97110 THERAPEUTIC EXERCISES: CPT | Mod: GO | Performed by: OCCUPATIONAL THERAPIST

## 2023-02-21 PROCEDURE — 97140 MANUAL THERAPY 1/> REGIONS: CPT | Mod: GO | Performed by: OCCUPATIONAL THERAPIST

## 2023-02-21 NOTE — PROGRESS NOTES
SOAP Note objective information for 2/21/2023    ROM  Thumb 10/18/22 10/18/22 11/18/22 11/25/22 11/29/22 1/31/23 2/7/23 2/14/23 2/21/23   AROM  (PROM) L R R R R R R R R   MP -22/67 -20/64 -20/67 -20/NT -20/70 -15/73 NT NT    IP +13/72 +12/5 +/12 +/13 +/13 +/5* (after treatment, improved to 21 degrees) +/16 (p tx 23) +/17 /16 (pre)  /19 post     RABD 43 47 NT NT NT NT NT NT    PABD 45 48 NT NT NT NT NT NT    Kapandji Opposition Scale (0-10/10) 10 7 NT NT NT NT NT NT    Please refer to the daily flowsheet for treatment today, total treatment time and time spent performing 1:1 timed codes.

## 2023-03-07 ENCOUNTER — THERAPY VISIT (OUTPATIENT)
Dept: OCCUPATIONAL THERAPY | Facility: CLINIC | Age: 58
End: 2023-03-07
Payer: COMMERCIAL

## 2023-03-07 DIAGNOSIS — S69.91XA INJURY OF RIGHT THUMB, INITIAL ENCOUNTER: Primary | ICD-10-CM

## 2023-03-07 DIAGNOSIS — M79.644 PAIN OF RIGHT THUMB: ICD-10-CM

## 2023-03-07 PROCEDURE — 97110 THERAPEUTIC EXERCISES: CPT | Mod: GO | Performed by: OCCUPATIONAL THERAPIST

## 2023-03-07 PROCEDURE — 97140 MANUAL THERAPY 1/> REGIONS: CPT | Mod: GO | Performed by: OCCUPATIONAL THERAPIST

## 2023-03-07 NOTE — PROGRESS NOTES
SOAP Note objective information for 3/7/2023    ROM  Thumb 10/18/22 10/18/22 11/18/22 11/25/22 11/29/22 1/31/23 2/7/23 2/14/23 2/21/23 3/7/23   AROM  (PROM) L R R R R R R R R R   MP -22/67 -20/64 -20/67 -20/NT -20/70 -15/73 NT NT  NT   IP +13/72 +12/5 +/12 +/13 +/13 +/5* (after treatment, improved to 21 degrees) +/16 (p tx 23) +/17 /16 (pre)  /19 post   /16(post 25)   RABD 43 47 NT NT NT NT NT NT  NT   PABD 45 48 NT NT NT NT NT NT  NT   Kapandji Opposition Scale (0-10/10) 10 7 NT NT NT NT NT NT  NT   Please refer to the daily flowsheet for treatment today, total treatment time and time spent performing 1:1 timed codes.

## 2023-03-14 ENCOUNTER — THERAPY VISIT (OUTPATIENT)
Dept: OCCUPATIONAL THERAPY | Facility: CLINIC | Age: 58
End: 2023-03-14
Payer: COMMERCIAL

## 2023-03-14 DIAGNOSIS — S69.91XA INJURY OF RIGHT THUMB, INITIAL ENCOUNTER: Primary | ICD-10-CM

## 2023-03-14 DIAGNOSIS — M79.644 PAIN OF RIGHT THUMB: ICD-10-CM

## 2023-03-14 PROCEDURE — 97110 THERAPEUTIC EXERCISES: CPT | Mod: GO | Performed by: OCCUPATIONAL THERAPIST

## 2023-03-14 PROCEDURE — 97140 MANUAL THERAPY 1/> REGIONS: CPT | Mod: GO | Performed by: OCCUPATIONAL THERAPIST

## 2023-03-14 NOTE — PROGRESS NOTES
SOAP Note objective information for 3/14/2023    ROM  Thumb 10/18/22 10/18/22 11/18/22 1/31/23 2/7/23 2/14/23 2/21/23 3/7/23 3/14/23   AROM  (PROM) L R R R R R R R R   MP -22/67 -20/64 -20/67 -15/73 NT NT  NT NT   IP +13/72 +12/5 +/12 +/5* (after treatment, improved to 21 degrees) +/16 (p tx 23) +/17 /16 (pre)  /19 post   /16(post 25) /22 (post 30)   RABD 43 47 NT NT NT NT  NT NT   PABD 45 48 NT NT NT NT  NT NT   Kapandji Opposition Scale (0-10/10) 10 7 NT NT NT NT  NT NT   Please refer to the daily flowsheet for treatment today, total treatment time and time spent performing 1:1 timed codes.

## 2023-03-21 ENCOUNTER — THERAPY VISIT (OUTPATIENT)
Dept: OCCUPATIONAL THERAPY | Facility: CLINIC | Age: 58
End: 2023-03-21
Payer: COMMERCIAL

## 2023-03-21 DIAGNOSIS — M79.644 PAIN OF RIGHT THUMB: ICD-10-CM

## 2023-03-21 DIAGNOSIS — S69.91XA INJURY OF RIGHT THUMB, INITIAL ENCOUNTER: Primary | ICD-10-CM

## 2023-03-21 PROCEDURE — 97110 THERAPEUTIC EXERCISES: CPT | Mod: GO | Performed by: OCCUPATIONAL THERAPIST

## 2023-03-21 PROCEDURE — 97140 MANUAL THERAPY 1/> REGIONS: CPT | Mod: GO | Performed by: OCCUPATIONAL THERAPIST

## 2023-03-22 ENCOUNTER — OFFICE VISIT (OUTPATIENT)
Dept: FAMILY MEDICINE | Facility: CLINIC | Age: 58
End: 2023-03-22
Payer: COMMERCIAL

## 2023-03-22 VITALS
HEART RATE: 62 BPM | DIASTOLIC BLOOD PRESSURE: 79 MMHG | SYSTOLIC BLOOD PRESSURE: 116 MMHG | TEMPERATURE: 97.6 F | RESPIRATION RATE: 16 BRPM | OXYGEN SATURATION: 98 %

## 2023-03-22 DIAGNOSIS — S16.1XXA STRAIN OF NECK MUSCLE, INITIAL ENCOUNTER: Primary | ICD-10-CM

## 2023-03-22 PROCEDURE — 99213 OFFICE O/P EST LOW 20 MIN: CPT | Performed by: PHYSICIAN ASSISTANT

## 2023-03-22 RX ORDER — CYCLOBENZAPRINE HCL 5 MG
10 TABLET ORAL 3 TIMES DAILY PRN
Qty: 30 TABLET | Refills: 0 | Status: SHIPPED | OUTPATIENT
Start: 2023-03-22 | End: 2023-03-30

## 2023-03-22 NOTE — PROGRESS NOTES
Assessment & Plan:      Problem List Items Addressed This Visit    None  Visit Diagnoses     Strain of neck muscle, initial encounter    -  Primary    Relevant Medications    cyclobenzaprine (FLEXERIL) 5 MG tablet    Other Relevant Orders    Massage Therapy Referral        Medical Decision Making  Patient presents with left neck and left shoulder pains worsening over the last 3 weeks.  Symptoms appear consistent with a muscle strain.  Low concern for radiculopathy given no significant numbness, tingling, or muscle weakness in the left lower extremity.  He further has no tenderness over the midline spine.  Recommend over-the-counter analgesics, warm compresses, and trial of muscle laxer.  Patient also has a neck brace at home.  Recommend he could try this to see if it helps with symptoms, but not to wear the neck brace all day long to allow for some stretching and movement of the neck.  Also placed referral to massage therapy as requested.  Discussed treatment and symptomatic care.  Allergies and medication interactions reviewed.  Discussed signs of worsening symptoms and when to follow-up with PCP if no symptom improvement.     Subjective:      Sina Stoner is a 57 year old male here for evaluation of left neck and left shoulder pains.  Onset of symptoms was 3 weeks ago with gradual worsening since then.  Patient does not recall any specific injury or trauma.  He woke up 1 morning with the pain.  Symptoms were initially on and off, but now have become constant throughout the day over the last week.  Pains are worsened with movement of the neck and movement of the left shoulder.  Patient denies chest pains and shortness of breath.  Patient did get a massage of the left neck 2 weeks ago with some good improvement of symptoms for a couple days.  He otherwise denies significant numbness, tingling, muscle weakness in the left upper extremity.     The following portions of the patient's history were reviewed and  updated as appropriate: allergies, current medications, and problem list.     Review of Systems  Pertinent items are noted in HPI.    Allergies  No Known Allergies    Family History   Problem Relation Age of Onset     Eye Disorder Mother      Psychotic Disorder Mother         dementia      Gastrointestinal Disease Mother      Alzheimer Disease Mother      Arthritis Mother      Osteoporosis Mother      Hyperlipidemia Father      Coronary Artery Disease Father         MI at 51 yo     No Known Problems Brother      Hypertension Brother      Alzheimer Disease Maternal Grandmother      Cancer Maternal Grandfather      No Known Problems Daughter      No Known Problems Daughter      Prostate Cancer No family hx of      Colon Cancer No family hx of      Diabetes No family hx of      Breast Cancer No family hx of      Cerebrovascular Disease No family hx of        Social History     Tobacco Use     Smoking status: Never     Smokeless tobacco: Never   Substance Use Topics     Alcohol use: Yes     Alcohol/week: 0.0 standard drinks     Comment: 0-2 beers per week        Objective:      /79   Pulse 62   Temp 97.6  F (36.4  C) (Oral)   Resp 16   SpO2 98%   General appearance - alert, well appearing, and in no distress and non-toxic  Neck - supple, no significant adenopathy  Extremities - Left upper extremity: Full range of motion with pain during full abduction above 90 degrees, otherwise no specific point tenderness  Skin - normal coloration and turgor, no rashes, no suspicious skin lesions noted   Back - no midline spinal tenderness, tenderness to the left cervical paraspinal muscles extending out to the left shoulder    The use of Dragon/PrestoBox dictation services was used to construct the content of this note; any grammatical errors are non-intentional. Please contact the author directly if you are in need of any clarification.

## 2023-03-28 ENCOUNTER — THERAPY VISIT (OUTPATIENT)
Dept: OCCUPATIONAL THERAPY | Facility: CLINIC | Age: 58
End: 2023-03-28
Payer: COMMERCIAL

## 2023-03-28 DIAGNOSIS — S69.91XA INJURY OF RIGHT THUMB, INITIAL ENCOUNTER: Primary | ICD-10-CM

## 2023-03-28 DIAGNOSIS — M79.644 PAIN OF RIGHT THUMB: ICD-10-CM

## 2023-03-28 PROCEDURE — 97110 THERAPEUTIC EXERCISES: CPT | Mod: GO | Performed by: OCCUPATIONAL THERAPIST

## 2023-03-28 PROCEDURE — 97140 MANUAL THERAPY 1/> REGIONS: CPT | Mod: GO | Performed by: OCCUPATIONAL THERAPIST

## 2023-03-30 ENCOUNTER — OFFICE VISIT (OUTPATIENT)
Dept: FAMILY MEDICINE | Facility: CLINIC | Age: 58
End: 2023-03-30
Payer: COMMERCIAL

## 2023-03-30 VITALS
TEMPERATURE: 97 F | WEIGHT: 210 LBS | BODY MASS INDEX: 30.06 KG/M2 | RESPIRATION RATE: 16 BRPM | HEIGHT: 70 IN | HEART RATE: 84 BPM | OXYGEN SATURATION: 98 % | DIASTOLIC BLOOD PRESSURE: 86 MMHG | SYSTOLIC BLOOD PRESSURE: 118 MMHG

## 2023-03-30 DIAGNOSIS — L65.9 ALOPECIA: ICD-10-CM

## 2023-03-30 DIAGNOSIS — S16.1XXA STRAIN OF NECK MUSCLE, INITIAL ENCOUNTER: ICD-10-CM

## 2023-03-30 DIAGNOSIS — Z12.5 SCREENING FOR PROSTATE CANCER: ICD-10-CM

## 2023-03-30 DIAGNOSIS — N52.9 ERECTILE DYSFUNCTION, UNSPECIFIED ERECTILE DYSFUNCTION TYPE: ICD-10-CM

## 2023-03-30 DIAGNOSIS — E78.5 HYPERLIPIDEMIA LDL GOAL <100: ICD-10-CM

## 2023-03-30 DIAGNOSIS — Z13.0 SCREENING, DEFICIENCY ANEMIA, IRON: Primary | ICD-10-CM

## 2023-03-30 DIAGNOSIS — Z00.00 ROUTINE GENERAL MEDICAL EXAMINATION AT A HEALTH CARE FACILITY: ICD-10-CM

## 2023-03-30 LAB
ALBUMIN SERPL BCG-MCNC: 4.6 G/DL (ref 3.5–5.2)
ALP SERPL-CCNC: 105 U/L (ref 40–129)
ALT SERPL W P-5'-P-CCNC: 48 U/L (ref 10–50)
ANION GAP SERPL CALCULATED.3IONS-SCNC: 12 MMOL/L (ref 7–15)
AST SERPL W P-5'-P-CCNC: 44 U/L (ref 10–50)
BILIRUB SERPL-MCNC: 1.2 MG/DL
BUN SERPL-MCNC: 10.8 MG/DL (ref 6–20)
CALCIUM SERPL-MCNC: 9.6 MG/DL (ref 8.6–10)
CHLORIDE SERPL-SCNC: 107 MMOL/L (ref 98–107)
CHOLEST SERPL-MCNC: 165 MG/DL
CREAT SERPL-MCNC: 1.2 MG/DL (ref 0.67–1.17)
DEPRECATED HCO3 PLAS-SCNC: 24 MMOL/L (ref 22–29)
ERYTHROCYTE [DISTWIDTH] IN BLOOD BY AUTOMATED COUNT: 12.2 % (ref 10–15)
GFR SERPL CREATININE-BSD FRML MDRD: 71 ML/MIN/1.73M2
GLUCOSE SERPL-MCNC: 96 MG/DL (ref 70–99)
HCT VFR BLD AUTO: 47.9 % (ref 40–53)
HDLC SERPL-MCNC: 42 MG/DL
HGB BLD-MCNC: 15.9 G/DL (ref 13.3–17.7)
LDLC SERPL CALC-MCNC: 72 MG/DL
MCH RBC QN AUTO: 29 PG (ref 26.5–33)
MCHC RBC AUTO-ENTMCNC: 33.2 G/DL (ref 31.5–36.5)
MCV RBC AUTO: 87 FL (ref 78–100)
NONHDLC SERPL-MCNC: 123 MG/DL
PLATELET # BLD AUTO: 200 10E3/UL (ref 150–450)
POTASSIUM SERPL-SCNC: 4.3 MMOL/L (ref 3.4–5.3)
PROT SERPL-MCNC: 7.3 G/DL (ref 6.4–8.3)
PSA SERPL DL<=0.01 NG/ML-MCNC: 0.64 NG/ML (ref 0–3.5)
RBC # BLD AUTO: 5.49 10E6/UL (ref 4.4–5.9)
SODIUM SERPL-SCNC: 143 MMOL/L (ref 136–145)
TRIGL SERPL-MCNC: 253 MG/DL
WBC # BLD AUTO: 5.6 10E3/UL (ref 4–11)

## 2023-03-30 PROCEDURE — 36415 COLL VENOUS BLD VENIPUNCTURE: CPT | Performed by: FAMILY MEDICINE

## 2023-03-30 PROCEDURE — G0103 PSA SCREENING: HCPCS | Performed by: FAMILY MEDICINE

## 2023-03-30 PROCEDURE — 80061 LIPID PANEL: CPT | Performed by: FAMILY MEDICINE

## 2023-03-30 PROCEDURE — 99396 PREV VISIT EST AGE 40-64: CPT | Performed by: FAMILY MEDICINE

## 2023-03-30 PROCEDURE — 80053 COMPREHEN METABOLIC PANEL: CPT | Performed by: FAMILY MEDICINE

## 2023-03-30 PROCEDURE — 85027 COMPLETE CBC AUTOMATED: CPT | Performed by: FAMILY MEDICINE

## 2023-03-30 RX ORDER — CYCLOBENZAPRINE HCL 5 MG
10 TABLET ORAL 3 TIMES DAILY PRN
Qty: 30 TABLET | Refills: 0 | Status: CANCELLED | OUTPATIENT
Start: 2023-03-30

## 2023-03-30 RX ORDER — ATORVASTATIN CALCIUM 20 MG/1
20 TABLET, FILM COATED ORAL DAILY
Qty: 90 TABLET | Refills: 3 | Status: SHIPPED | OUTPATIENT
Start: 2023-03-30 | End: 2024-04-08

## 2023-03-30 RX ORDER — SILDENAFIL 50 MG/1
50 TABLET, FILM COATED ORAL DAILY PRN
Qty: 30 TABLET | Refills: 5 | Status: SHIPPED | OUTPATIENT
Start: 2023-03-30 | End: 2024-04-08

## 2023-03-30 RX ORDER — FINASTERIDE 1 MG/1
1 TABLET, FILM COATED ORAL DAILY
Qty: 90 TABLET | Refills: 3 | Status: SHIPPED | OUTPATIENT
Start: 2023-03-30 | End: 2024-04-08

## 2023-03-30 ASSESSMENT — ENCOUNTER SYMPTOMS
CONSTIPATION: 0
NERVOUS/ANXIOUS: 0
PALPITATIONS: 0
FREQUENCY: 0
SORE THROAT: 0
EYE PAIN: 0
NAUSEA: 0
PARESTHESIAS: 0
COUGH: 0
ABDOMINAL PAIN: 0
HEMATOCHEZIA: 0
DYSURIA: 0
DIZZINESS: 0
JOINT SWELLING: 0
HEMATURIA: 0
DIARRHEA: 0
HEADACHES: 0
HEARTBURN: 0
ARTHRALGIAS: 1
WEAKNESS: 0
MYALGIAS: 0
FEVER: 0
CHILLS: 0
SHORTNESS OF BREATH: 0

## 2023-03-30 ASSESSMENT — PATIENT HEALTH QUESTIONNAIRE - PHQ9
5. POOR APPETITE OR OVEREATING: SEVERAL DAYS
SUM OF ALL RESPONSES TO PHQ QUESTIONS 1-9: 4
SUM OF ALL RESPONSES TO PHQ QUESTIONS 1-9: 4
10. IF YOU CHECKED OFF ANY PROBLEMS, HOW DIFFICULT HAVE THESE PROBLEMS MADE IT FOR YOU TO DO YOUR WORK, TAKE CARE OF THINGS AT HOME, OR GET ALONG WITH OTHER PEOPLE: SOMEWHAT DIFFICULT

## 2023-03-30 ASSESSMENT — ANXIETY QUESTIONNAIRES
6. BECOMING EASILY ANNOYED OR IRRITABLE: NOT AT ALL
5. BEING SO RESTLESS THAT IT IS HARD TO SIT STILL: NOT AT ALL
GAD7 TOTAL SCORE: 4
IF YOU CHECKED OFF ANY PROBLEMS ON THIS QUESTIONNAIRE, HOW DIFFICULT HAVE THESE PROBLEMS MADE IT FOR YOU TO DO YOUR WORK, TAKE CARE OF THINGS AT HOME, OR GET ALONG WITH OTHER PEOPLE: NOT DIFFICULT AT ALL
GAD7 TOTAL SCORE: 4
2. NOT BEING ABLE TO STOP OR CONTROL WORRYING: NOT AT ALL
7. FEELING AFRAID AS IF SOMETHING AWFUL MIGHT HAPPEN: NOT AT ALL
3. WORRYING TOO MUCH ABOUT DIFFERENT THINGS: SEVERAL DAYS
1. FEELING NERVOUS, ANXIOUS, OR ON EDGE: MORE THAN HALF THE DAYS

## 2023-03-30 NOTE — PATIENT INSTRUCTIONS
Preventive Health Recommendations  Male Ages 50 - 64    Yearly exam:             See your health care provider every year in order to  o   Review health changes.   o   Discuss preventive care.    o   Review your medicines if your doctor has prescribed any.   Have a cholesterol test every 5 years, or more frequently if you are at risk for high cholesterol/heart disease.   Have a diabetes test (fasting glucose) every three years. If you are at risk for diabetes, you should have this test more often.   Have a colonoscopy at age 50, or have a yearly FIT test (stool test). These exams will check for colon cancer.    Talk with your health care provider about whether or not a prostate cancer screening test (PSA) is right for you.  You should be tested each year for STDs (sexually transmitted diseases), if you re at risk.     Shots: Get a flu shot each year. Get a tetanus shot every 10 years.     Nutrition:  Eat at least 5 servings of fruits and vegetables daily.   Eat whole-grain bread, whole-wheat pasta and brown rice instead of white grains and rice.   Get adequate Calcium and Vitamin D.     Lifestyle  Exercise for at least 150 minutes a week (30 minutes a day, 5 days a week). This will help you control your weight and prevent disease.   Limit alcohol to one drink per day.   No smoking.   Wear sunscreen to prevent skin cancer.   See your dentist every six months for an exam and cleaning.   See your eye doctor every 1 to 2 years.      Quirino and Daron - neck exercises

## 2023-03-30 NOTE — PROGRESS NOTES
SUBJECTIVE:   CC: Quirino is an 57 year old who presents for preventative health visit.   No flowsheet data found.Patient has been advised of split billing requirements and indicates understanding: Yes     Healthy Habits:     Getting at least 3 servings of Calcium per day:  Yes    Bi-annual eye exam:  Yes    Dental care twice a year:  Yes    Sleep apnea or symptoms of sleep apnea:  None    Diet:  Low fat/cholesterol and Carbohydrate counting    Frequency of exercise:  2-3 days/week    Duration of exercise:  30-45 minutes    Taking medications regularly:  Yes    Medication side effects:  None    PHQ-2 Total Score: 1    Additional concerns today:  No    Left sided neck pinched nerve    Medication Followup of : finasteride (PROPECIA) 1 MG tablet    Taking Medication as prescribed: yes    Side Effects:  None    Medication Helping Symptoms:  yes  Symptoms  Medication Followup of : sildenafil (VIAGRA) 50 MG tablet    Taking Medication as prescribed: yes    Side Effects:  None    Medication Helping Symptoms:  yes    Hyperlipidemia Follow-Up    Are you regularly taking any medication or supplement to lower your cholesterol?   Yes- atorvastatin (LIPITOR) 20 MG tablet    Are you having muscle aches or other side effects that you think could be caused by your cholesterol lowering medication?  No    LDL Cholesterol Calculated (mg/dL)   Date Value   03/30/2023 72   02/11/2022 65   11/20/2020 107 (H)   08/20/2019 73       Depression and Anxiety Follow-Up    How are you doing with your depression since your last visit? Worsened     How are you doing with your anxiety since your last visit?  Worsened     Are you having other symptoms that might be associated with depression or anxiety? No    Have you had a significant life event? 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 No    Social History     Tobacco Use     Smoking  status: Never     Smokeless tobacco: Never   Vaping Use     Vaping status: Never Used   Substance Use Topics     Alcohol use: Yes     Alcohol/week: 0.0 standard drinks of alcohol     Comment: 0-2 beers per week     Drug use: No         7/13/2021     4:23 PM 2/11/2022     6:58 AM 3/30/2023     7:18 AM   PHQ   PHQ-9 Total Score 3 2 4   Q9: Thoughts of better off dead/self-harm past 2 weeks Not at all Not at all Not at all         7/13/2021     4:23 PM 2/11/2022     6:58 AM 3/30/2023     7:44 AM   ASPEN-7 SCORE   Total Score  1 (minimal anxiety)    Total Score 3 1 4         3/30/2023     7:18 AM   Last PHQ-9   1.  Little interest or pleasure in doing things 1   2.  Feeling down, depressed, or hopeless 1   3.  Trouble falling or staying asleep, or sleeping too much 1   4.  Feeling tired or having little energy 0   5.  Poor appetite or overeating 0   6.  Feeling bad about yourself 1   7.  Trouble concentrating 0   8.  Moving slowly or restless 0   Q9: Thoughts of better off dead/self-harm past 2 weeks 0   PHQ-9 Total Score 4         3/30/2023     7:44 AM   ASPEN-7    1. Feeling nervous, anxious, or on edge 2   2. Not being able to stop or control worrying 0   3. Worrying too much about different things 1   4. Trouble relaxing 1   5. Being so restless that it is hard to sit still 0   6. Becoming easily annoyed or irritable 0   7. Feeling afraid, as if something awful might happen 0   ASPEN-7 Total Score 4   If you checked any problems, how difficult have they made it for you to do your work, take care of things at home, or get along with other people? Not difficult at all     Suicide Assessment Five-step Evaluation and Treatment (SAFE-T)    Social History     Tobacco Use     Smoking status: Never     Smokeless tobacco: Never   Vaping Use     Vaping status: Never Used   Substance Use Topics     Alcohol use: Yes     Alcohol/week: 0.0 standard drinks of alcohol     Comment: 0-2 beers per week             3/30/2023     7:21 AM  "  Alcohol Use   Prescreen: >3 drinks/day or >7 drinks/week? No     Last PSA:   PSA   Date Value Ref Range Status   11/20/2020 0.77 0 - 4 ug/L Final     Comment:     Assay Method:  Chemiluminescence using Siemens Vista analyzer     Prostate Specific Antigen Screen   Date Value Ref Range Status   03/30/2023 0.64 0.00 - 3.50 ng/mL Final   02/11/2022 0.59 0.00 - 4.00 ug/L Final       Reviewed orders with patient. Reviewed health maintenance and updated orders accordingly - Yes      Reviewed and updated as needed this visit by clinical staff   Tobacco  Allergies  Meds  Problems  Med Hx  Surg Hx  Fam Hx        Reviewed and updated as needed this visit by Provider   Tobacco  Allergies  Meds  Problems  Med Hx  Surg Hx  Fam Hx             Review of Systems   Constitutional: Negative for chills and fever.   HENT: Negative for congestion, ear pain, hearing loss and sore throat.    Eyes: Negative for pain and visual disturbance.   Respiratory: Negative for cough and shortness of breath.    Cardiovascular: Negative for chest pain, palpitations and peripheral edema.   Gastrointestinal: Negative for abdominal pain, constipation, diarrhea, heartburn, hematochezia and nausea.   Genitourinary: Positive for impotence. Negative for dysuria, frequency, genital sores, hematuria and penile discharge.   Musculoskeletal: Positive for arthralgias. Negative for joint swelling and myalgias.   Skin: Negative for rash.   Neurological: Negative for dizziness, weakness, headaches and paresthesias.   Psychiatric/Behavioral: Negative for mood changes. The patient is not nervous/anxious.        OBJECTIVE:   /86 (BP Location: Left arm, Patient Position: Sitting, Cuff Size: Adult Large)   Pulse 84   Temp 97  F (36.1  C) (Tympanic)   Resp 16   Ht 1.778 m (5' 10\")   Wt 95.3 kg (210 lb)   SpO2 98%   BMI 30.13 kg/m    EXAM:  GENERAL: healthy, alert and no distress  EYES: Eyes grossly normal to inspection, PERRL and conjunctivae " and sclerae normal  HENT: ear canals and TM's normal, nose and mouth without ulcers or lesions  NECK: no adenopathy, no asymmetry, masses, or scars and thyroid normal to palpation  RESP: lungs clear to auscultation - no rales, rhonchi or wheezes  BREAST: normal without masses, tenderness or nipple discharge and no palpable axillary masses or adenopathy  CV: regular rate and rhythm, normal S1 S2, no S3 or S4, no murmur, click or rub, no peripheral edema and peripheral pulses strong  ABDOMEN: soft, nontender, no hepatosplenomegaly, no masses and bowel sounds normal   (male): normal male genitalia without lesions or urethral discharge, no hernia  MS: no gross musculoskeletal defects noted, no edema  SKIN: no suspicious lesions or rashes  NEURO: Normal strength and tone, mentation intact and speech normal  PSYCH: mentation appears normal, affect normal/bright  LYMPH: no cervical, supraclavicular, axillary, or inguinal adenopathy  RECTAL: declined exam      ASSESSMENT/PLAN:   Routine general medical examination at a health care facility    Hyperlipidemia LDL goal <100  Controlled - continue medication(s).      - Lipid panel reflex to direct LDL Non-fasting  - COMPREHENSIVE METABOLIC PANEL  - atorvastatin (LIPITOR) 20 MG tablet  Dispense: 90 tablet; Refill: 3  - Lipid panel reflex to direct LDL Non-fasting  - COMPREHENSIVE METABOLIC PANEL    Strain of neck muscle, initial encounter  Range of motion exercises, heat heat might help as well as massage.      Alopecia  Stable - continue medication(s).  - finasteride (PROPECIA) 1 MG tablet  Dispense: 90 tablet; Refill: 3    Erectile dysfunction, unspecified erectile dysfunction type  Stable - continue medication(s).  - sildenafil (VIAGRA) 50 MG tablet  Dispense: 30 tablet; Refill: 5    Screening for prostate cancer  - PROSTATE SPEC ANTIGEN SCREEN  - PROSTATE SPEC ANTIGEN SCREEN    Screening, deficiency anemia, iron  - CBC with Platelets  - CBC with  "Platelets      COUNSELING:  Reviewed preventive health counseling, as reflected in patient instructions      Estimated body mass index is 30.13 kg/m  as calculated from the following:    Height as of this encounter: 1.778 m (5' 10\").    Weight as of this encounter: 95.3 kg (210 lb).  Weight management plan: Discussed healthy diet and exercise guidelines     reports that he has never smoked. He has never used smokeless tobacco.      Return in about 1 year (around 3/30/2024) for wellness exam with fasting labs with Alonzo Herrera MD.    Follow-up Visit   Expected date:  Mar 30, 2024 (Approximate)      Follow Up Appointment Details:     Follow-up with whom?: Me    Follow-Up for what?: Adult Preventive    How?: In Person    Is this an as-needed follow-up?: No                        Alonzo Herrera MD     [address] [address]  [email or web address]     Office: [phone]     Answers for HPI/ROS submitted by the patient on 3/30/2023  If you checked off any problems, how difficult have these problems made it for you to do your work, take care of things at home, or get along with other people?: Somewhat difficult  PHQ9 TOTAL SCORE: 4      "

## 2023-04-03 NOTE — RESULT ENCOUNTER NOTE
Dear Quirino,    Here is a summary of your recent test results:  -Normal red blood cell (hgb) levels, normal white blood cell count and normal platelet levels.  -PSA (prostate specific antigen) test is normal.  This indicates a low likelihood of prostate cancer.  ADVISE: rechecking this in 1 year.  -Cholesterol levels are at your goal levels.  ADVISE: continuing your medication, a regular exercise program with at least 150 minutes of aerobic exercise per week, and eating a low saturated fat/low carbohydrate diet.  Also, you should recheck this fasting cholesterol panel in 12 months.  -Liver and gallbladder tests are normal (ALT,AST, Alk phos, bilirubin), kidney function is normal (Cr, GFR), sodium is normal, potassium is normal, calcium is normal, glucose is normal.    For additional lab test information, www.testing.com is a very good reference.    In addition, here is a list of due or overdue Health Maintenance reminders:  Zoster (Shingles) Vaccine(1 of 2) Never done  COVID-19 Vaccine(4 - Booster for Pfizer series) due on 01/13/2022  Flu Vaccine(1) due on 09/01/2022    Please call us at [phone] (or use onlinetours) to address the above recommendations if needed.           Thank you very much for trusting me and [facility].     Have a peaceful day.    Healthy regards,  Alonzo Herrera MD

## 2023-04-04 ENCOUNTER — THERAPY VISIT (OUTPATIENT)
Dept: OCCUPATIONAL THERAPY | Facility: CLINIC | Age: 58
End: 2023-04-04
Payer: COMMERCIAL

## 2023-04-04 DIAGNOSIS — S69.91XA INJURY OF RIGHT THUMB, INITIAL ENCOUNTER: Primary | ICD-10-CM

## 2023-04-04 DIAGNOSIS — M79.644 PAIN OF RIGHT THUMB: ICD-10-CM

## 2023-04-04 PROCEDURE — 97035 APP MDLTY 1+ULTRASOUND EA 15: CPT | Mod: GO | Performed by: OCCUPATIONAL THERAPIST

## 2023-04-04 PROCEDURE — 97110 THERAPEUTIC EXERCISES: CPT | Mod: GO | Performed by: OCCUPATIONAL THERAPIST

## 2023-04-04 NOTE — PROGRESS NOTES
Hand Therapy Progress Note  Current Date:  4/4/23  Reporting Period:  1/31/23 to 4/4/23  Subjective:  Sina Stoner is a 57 year old right hand dominant male.    Diagnosis: R thumb non displaced fractures of distal and proximal phalanx  DOI:  9/9/22    S:  Subjective changes as noted by patient: Not getting worse. Sensitive over the scar. It's OK at best. It's hard to get a good  on stuff.   Functional changes noted by patient: Getting the car door open is getting a little better.  Response to previous treatment:  good  Patient has noted adverse reaction to:   None      O:  Pain Level (Scale 0-10):   9/21/2022 11/29/22 1/31/22 4/4/23   At Rest 2/10 1/10 0/10 0/10   With Use 8/10 6/10 2/10 2     Pain Description:  Date 9/21/2022 11/29/22 1/31/23 4/4/23   Location R thumb IP joint R thumb IP joint R thumb IP joint IP joint of thumb   Pain Quality Pressure and Tingling pressure Sharp achy   Frequency constant   constant intermittent intermittent   Pain is worst  daytime or nighttime daytime Daytime day   Exacerbated by  Occurs randomly Pinching/gripping Pressure--pushing, opening car door Car door - but it is improving   Relieved by cold, NSAIDs, elevation Cold, elevation Sudden relief--quick pain Stretching it   Progression Gradually improving Gradually improving Gradually improving Gradually improving     Wound: small wound present on the volar aspect of the thumb distal phalanx.  No drainage present. 1/31/23: scar, moderate to significant adherence at the level of the IP, particularly to the radial aspect     Edema: mild to moderate edema present, pt reports this tends to improve with scar mobilization    ROM  Thumb 10/18/22 10/18/22 11/18/22 1/31/23 2/7/23 2/14/23 2/21/23 3/7/23 3/14/23 4/4/23   AROM  (PROM) L R R R R R R R R R   MP -22/67 -20/64 -20/67 -15/73 NT NT  NT NT    IP +13/72 +12/5 +/12 +/5* (after treatment, improved to 21 degrees) +/16 (p tx 23) +/17 /16 (pre)  /19 post   /16(post 25) /22 (post 30)  /23    (27 post)   RABD 43 47 NT NT NT NT  NT NT    PABD 45 48 NT NT NT NT  NT NT    Kapandji Opposition Scale (0-10/10) 10 7 NT NT NT NT  NT NT    Please refer to the daily flowsheet for treatment today, total treatment time and time spent performing 1:1 timed codes.        Strength   (Measured in pounds)  Pain Report:  scale of 0-10/10    Lat Pinch 1/31/2023 1/31/2023 4/4/23 4/4/23   Trials L R L R   1  2  3 21 22 20 23   Average 21 22       3 Pt Pinch 1/31/2023 1/31/2023 4/4/23 4/4/23   Trials L R L R   1  2  3 21 22 22 21   Average 21 22       Assessment:  Response to therapy has been improvement to:  ROM of the thumb MCP flexion/extension  Strength:  Pinch strength  Response to therapy has been slow progress in:  ROM of thumb IP flexion    Overall Assessment:  Patient's symptoms are resolving.  Patient is ready to progress to more complex exercises.  Patient is becoming more independent in home exercise program  Patient would benefit from continued therapy to achieve rehab potential  STG/LTG:  STGoals have been reviewed;  see goal sheet for details and updates.  LTGoals have been reviewed;  see goal sheet for details and updates.    I have re-evaluated this patient and find that the nature, scope, duration and intensity of the therapy is appropriate for the medical condition of the patient.    Plan: Frequency:  1 X week, once daily  Duration: for 6 weeks, then every other week for 4 weeks (total 8 visits)    Treatment Plan:  Modalities:  US and Paraffin  Therapeutic Exercise:  AROM, AAROM, PROM, Tendon Gliding, Blocking, Reverse Blocking, Place and Hold, Isotonics and Isometrics  Neuromuscular re-education:  Nerve Gliding, Coordination/Dexterity, Kinesthetic Training, Proprioceptive Training and Isometrics  Manual Techniques:  Coordination/Dexterity, Joint mobilization, Scar mobilization, Myofascial release and Manual edema mobilization  Orthotic Fabrication:  Static, Static progressive and Hand  based  Discharge Plan:  Achieve all LTG.  Independent in home treatment program.  Reach maximal therapeutic benefit.    Home Exercise Program:  A/PROM  Scar mobilization  Increase use  Flexion Taping    Next Visit:  US  Scar mobilization  A/PROM  Static-progressive IP flexion orthosis

## 2023-04-13 ENCOUNTER — THERAPY VISIT (OUTPATIENT)
Dept: OCCUPATIONAL THERAPY | Facility: CLINIC | Age: 58
End: 2023-04-13
Payer: COMMERCIAL

## 2023-04-13 DIAGNOSIS — S69.91XA INJURY OF RIGHT THUMB, INITIAL ENCOUNTER: Primary | ICD-10-CM

## 2023-04-13 DIAGNOSIS — M79.644 PAIN OF RIGHT THUMB: ICD-10-CM

## 2023-04-13 PROCEDURE — 97140 MANUAL THERAPY 1/> REGIONS: CPT | Mod: GO | Performed by: OCCUPATIONAL THERAPIST

## 2023-04-13 PROCEDURE — 97110 THERAPEUTIC EXERCISES: CPT | Mod: GO | Performed by: OCCUPATIONAL THERAPIST

## 2023-04-18 ENCOUNTER — THERAPY VISIT (OUTPATIENT)
Dept: OCCUPATIONAL THERAPY | Facility: CLINIC | Age: 58
End: 2023-04-18
Payer: COMMERCIAL

## 2023-04-18 DIAGNOSIS — M79.644 PAIN OF RIGHT THUMB: ICD-10-CM

## 2023-04-18 DIAGNOSIS — S69.91XA INJURY OF RIGHT THUMB, INITIAL ENCOUNTER: Primary | ICD-10-CM

## 2023-04-18 PROCEDURE — 97110 THERAPEUTIC EXERCISES: CPT | Mod: GO | Performed by: OCCUPATIONAL THERAPIST

## 2023-04-18 PROCEDURE — 97140 MANUAL THERAPY 1/> REGIONS: CPT | Mod: GO | Performed by: OCCUPATIONAL THERAPIST

## 2023-04-25 ENCOUNTER — THERAPY VISIT (OUTPATIENT)
Dept: OCCUPATIONAL THERAPY | Facility: CLINIC | Age: 58
End: 2023-04-25
Payer: COMMERCIAL

## 2023-04-25 DIAGNOSIS — S69.91XA INJURY OF RIGHT THUMB, INITIAL ENCOUNTER: Primary | ICD-10-CM

## 2023-04-25 DIAGNOSIS — M79.644 PAIN OF RIGHT THUMB: ICD-10-CM

## 2023-04-25 PROCEDURE — 97140 MANUAL THERAPY 1/> REGIONS: CPT | Mod: GO | Performed by: OCCUPATIONAL THERAPIST

## 2023-04-25 PROCEDURE — 97110 THERAPEUTIC EXERCISES: CPT | Mod: GO | Performed by: OCCUPATIONAL THERAPIST

## 2023-04-25 NOTE — PROGRESS NOTES
SOAP Note objective information for 4/25/2023    ROM  Thumb 10/18/22 10/18/22 11/18/22 1/31/23 2/7/23 2/14/23 2/21/23 3/7/23 3/14/23 4/4/23 4/25/23   AROM  (PROM) L R R R R R R R R R R   MP -22/67 -20/64 -20/67 -15/73 NT NT  NT NT  NT   IP +13/72 +12/5 +/12 +/5* (after treatment, improved to 21 degrees) +/16 (p tx 23) +/17 /16 (pre)  /19 post   /16(post 25) /22 (post 30) /23    (27 post) /19  (25 post)   RABD 43 47 NT NT NT NT  NT NT  NT   PABD 45 48 NT NT NT NT  NT NT  NT   Kapandji Opposition Scale (0-10/10) 10 7 NT NT NT NT  NT NT  NT   Please refer to the daily flowsheet for treatment today, total treatment time and time spent performing 1:1 timed codes.

## 2023-05-02 ENCOUNTER — THERAPY VISIT (OUTPATIENT)
Dept: OCCUPATIONAL THERAPY | Facility: CLINIC | Age: 58
End: 2023-05-02
Payer: COMMERCIAL

## 2023-05-02 DIAGNOSIS — S69.91XA INJURY OF RIGHT THUMB, INITIAL ENCOUNTER: Primary | ICD-10-CM

## 2023-05-02 DIAGNOSIS — M79.644 PAIN OF RIGHT THUMB: ICD-10-CM

## 2023-05-02 PROCEDURE — 97110 THERAPEUTIC EXERCISES: CPT | Mod: GO | Performed by: OCCUPATIONAL THERAPIST

## 2023-05-02 PROCEDURE — 97140 MANUAL THERAPY 1/> REGIONS: CPT | Mod: GO | Performed by: OCCUPATIONAL THERAPIST

## 2023-05-09 ENCOUNTER — THERAPY VISIT (OUTPATIENT)
Dept: OCCUPATIONAL THERAPY | Facility: CLINIC | Age: 58
End: 2023-05-09
Payer: COMMERCIAL

## 2023-05-09 DIAGNOSIS — M79.644 PAIN OF RIGHT THUMB: ICD-10-CM

## 2023-05-09 DIAGNOSIS — S69.91XA INJURY OF RIGHT THUMB, INITIAL ENCOUNTER: Primary | ICD-10-CM

## 2023-05-09 PROCEDURE — 97110 THERAPEUTIC EXERCISES: CPT | Mod: GO | Performed by: OCCUPATIONAL THERAPIST

## 2023-05-09 PROCEDURE — 97140 MANUAL THERAPY 1/> REGIONS: CPT | Mod: GO | Performed by: OCCUPATIONAL THERAPIST

## 2023-05-09 NOTE — PROGRESS NOTES
SOAP Note objective information for 5/9/2023    ROM  Thumb 10/18/22 10/18/22 2/21/23 3/7/23 3/14/23 4/4/23 4/25/23 5/9/23   AROM  (PROM) L R R R R R R R   MP -22/67 -20/64  NT NT  NT NT   IP +13/72 +12/5 /16 (pre)  /19 post   /16(post 25) /22 (post 30) /23    (27 post) /19  (25 post) /15   RABD 43 47  NT NT  NT NT   PABD 45 48  NT NT  NT NT   Kapandji Opposition Scale (0-10/10) 10 7  NT NT  NT NT   Please refer to the daily flowsheet for treatment today, total treatment time and time spent performing 1:1 timed codes.

## 2023-05-16 ENCOUNTER — THERAPY VISIT (OUTPATIENT)
Dept: OCCUPATIONAL THERAPY | Facility: CLINIC | Age: 58
End: 2023-05-16
Payer: COMMERCIAL

## 2023-05-16 DIAGNOSIS — S69.91XA INJURY OF RIGHT THUMB, INITIAL ENCOUNTER: Primary | ICD-10-CM

## 2023-05-16 DIAGNOSIS — M79.644 PAIN OF RIGHT THUMB: ICD-10-CM

## 2023-05-16 PROCEDURE — 97110 THERAPEUTIC EXERCISES: CPT | Mod: GO | Performed by: OCCUPATIONAL THERAPIST

## 2023-05-16 PROCEDURE — 97140 MANUAL THERAPY 1/> REGIONS: CPT | Mod: GO | Performed by: OCCUPATIONAL THERAPIST

## 2023-05-16 NOTE — PROGRESS NOTES
SOAP Note objective information for 5/16/2023    ROM  Thumb 10/18/22 10/18/22 2/21/23 3/7/23 3/14/23 4/4/23 4/25/23 5/9/23 5/16/23   AROM  (PROM) L R R R R R R R R   MP -22/67 -20/64  NT NT  NT NT NT   IP +13/72 +12/5 /16 (pre)  /19 post   /16(post 25) /22 (post 30) /23    (27 post) /19  (25 post) /15 /19   RABD 43 47  NT NT  NT NT NT   PABD 45 48  NT NT  NT NT NT   Kapandji Opposition Scale (0-10/10) 10 7  NT NT  NT NT NT   Please refer to the daily flowsheet for treatment today, total treatment time and time spent performing 1:1 timed codes.

## 2023-05-23 ENCOUNTER — THERAPY VISIT (OUTPATIENT)
Dept: OCCUPATIONAL THERAPY | Facility: CLINIC | Age: 58
End: 2023-05-23
Payer: COMMERCIAL

## 2023-05-23 DIAGNOSIS — M79.644 PAIN OF RIGHT THUMB: ICD-10-CM

## 2023-05-23 DIAGNOSIS — S69.91XA INJURY OF RIGHT THUMB, INITIAL ENCOUNTER: Primary | ICD-10-CM

## 2023-05-23 PROCEDURE — 97140 MANUAL THERAPY 1/> REGIONS: CPT | Mod: GO | Performed by: OCCUPATIONAL THERAPIST

## 2023-05-23 PROCEDURE — 97110 THERAPEUTIC EXERCISES: CPT | Mod: GO | Performed by: OCCUPATIONAL THERAPIST

## 2023-06-10 NOTE — PROGRESS NOTES
[facility] Progress Note  Date: Jun 13, 2023    Patient Name: Sina Stoner  MRN: [id number]    CHIEF COMPLAINT: Right thumb crush injury    Dominant Hand: right  Occupation:       HPI:    6/13/23: Patient was last seen 10/18/22. Since last visit patient has been attending hand therapy and working on home exercise program. He has numbness around laceration scar. He has intermittent tenderness, weakness and decreased ROM of IP joint. He states his hand therapist recommend he come in for a follow up visit. Main issue is stiffness    10/18/22: Patient was last seen 9/20/22. Since last visit patient reports some tingling and swelling. He has some sensitivity over the scar which has fully healed. He obtained a custom splint from OT and has been wearing this consistently. Pain has improved.    Mr. Sina Stoner is a 57 year old male right hand dominant who presents with crush injury to right thumb. Hand got crushed by heavy marble table on 9/9/22 at his cabin. History of trigger thumb.  X-rays at the time demonstrated nondisplaced fractures of the distal and proximal phalanx of the thumb.  He also sustained a superficial wound on the volar aspect of the proximal phalanx near the IP joint.  He was started on Keflex which he has finished.  He states that he continues to ooze yellow crusty fluid.  He has been applying bacitracin to it.  He was given a splint but it is opening so he is just been babying the thumb.  Has been using Tylenol.   Swelling has improved.  Mild thumb tingling.  History of trigger thumb.      VITALS:  There were no vitals filed for this visit.    EXAM:  General: NAD, A&Ox3  HEENT: NC/AT  CV: RRR by peripheral pulse  Pulmonary: Non-labored breathing on RA    RUE:  Scar over the volar aspect of the right thumb proximal phalanx fully healed  No erythema or fluctuance  No tenderness of the thumb proximal and distal phalanx    EPL and FPL are  intact, IP joint remains quite stiff, approximately 20 deg of active flexion  Sensation is intact to light touch in median, radial, ulnar nerve distributions, no deficits at radial or ulnar borders of thumb ip  Warm well-perfused, capillary refill less than 2 seconds        IMAGING:  X-rays today demonstrate healed fracture of proximal phalanx. There also appears to be a fractured osteophyte off the radial base of the distal phalanx that is also healed.    I have personally reviewed the above images and labs.          IMPRESSION AND RECOMMENDATIONS:  Mr. Sina Stoner is a 57 year old male right hand dominant with right thumb crush injury, resulting in nondisplaced proximal and distal phalangeal fractures and a superficial volar wound, all of which have healed.    Main issue now is stiffness of the thumb IP joint.    The patient does have evidence of arthrosis at the IP joint at the time of injury that was likely aggravated by the injury and immobilization.    At this point options are limited. I did discuss MRI of the thumb IP joint with consideration of joint debridement of any interposed scar or tissue.  He is a  and would like to maintain flexibility of the IP joint. He has minimal IP pain so I do not think fusion is necessary.    He has many plans for the summer so wants to continue with exercises and will reevaluate in the fall.  He will let me know if he decides to proceed.    If the scar is still bothersome at that time, we can consider intralesional Kenalog injection.    All questions answered.  The patient voiced understanding agreement.    Larry Brar MD    Hand, Upper Extremity & Microvascular Surgery  Department of Orthopaedic Surgery  [facility]

## 2023-06-13 ENCOUNTER — THERAPY VISIT (OUTPATIENT)
Dept: OCCUPATIONAL THERAPY | Facility: CLINIC | Age: 58
End: 2023-06-13
Payer: COMMERCIAL

## 2023-06-13 ENCOUNTER — OFFICE VISIT (OUTPATIENT)
Dept: ORTHOPEDICS | Facility: CLINIC | Age: 58
End: 2023-06-13
Payer: COMMERCIAL

## 2023-06-13 ENCOUNTER — ANCILLARY PROCEDURE (OUTPATIENT)
Dept: GENERAL RADIOLOGY | Facility: CLINIC | Age: 58
End: 2023-06-13
Attending: STUDENT IN AN ORGANIZED HEALTH CARE EDUCATION/TRAINING PROGRAM
Payer: COMMERCIAL

## 2023-06-13 VITALS — SYSTOLIC BLOOD PRESSURE: 120 MMHG | WEIGHT: 212.2 LBS | BODY MASS INDEX: 30.45 KG/M2 | DIASTOLIC BLOOD PRESSURE: 81 MMHG

## 2023-06-13 DIAGNOSIS — S69.91XA INJURY OF RIGHT THUMB, INITIAL ENCOUNTER: Primary | ICD-10-CM

## 2023-06-13 DIAGNOSIS — S69.91XA INJURY OF RIGHT THUMB, INITIAL ENCOUNTER: ICD-10-CM

## 2023-06-13 DIAGNOSIS — M79.644 PAIN OF RIGHT THUMB: ICD-10-CM

## 2023-06-13 PROCEDURE — 97140 MANUAL THERAPY 1/> REGIONS: CPT | Mod: GO | Performed by: OCCUPATIONAL THERAPIST

## 2023-06-13 PROCEDURE — 99213 OFFICE O/P EST LOW 20 MIN: CPT | Performed by: STUDENT IN AN ORGANIZED HEALTH CARE EDUCATION/TRAINING PROGRAM

## 2023-06-13 PROCEDURE — 73140 X-RAY EXAM OF FINGER(S): CPT | Mod: TC | Performed by: RADIOLOGY

## 2023-06-13 PROCEDURE — 97110 THERAPEUTIC EXERCISES: CPT | Mod: GO | Performed by: OCCUPATIONAL THERAPIST

## 2023-06-13 NOTE — LETTER
6/13/2023         RE: Sina Stoner  [address] [facility] [address]        Dear Colleague,    Thank you for referring your patient, Sina Stoner, to the [facility] [place]. Please see a copy of my visit note below.    Orthopaedic Surgery Hand and Upper Extremity Clinic Progress Note  Date: Jun 13, 2023    Patient Name: Sina Stoner  MRN: [id number]    CHIEF COMPLAINT: Right thumb crush injury    Dominant Hand: right  Occupation:       HPI:    6/13/23: Patient was last seen 10/18/22. Since last visit patient has been attending hand therapy and working on home exercise program. He has numbness around laceration scar. He has intermittent tenderness, weakness and decreased ROM of IP joint. He states his hand therapist recommend he come in for a follow up visit. Main issue is stiffness    10/18/22: Patient was last seen 9/20/22. Since last visit patient reports some tingling and swelling. He has some sensitivity over the scar which has fully healed. He obtained a custom splint from OT and has been wearing this consistently. Pain has improved.    Mr. Sina Stoner is a 57 year old male right hand dominant who presents with crush injury to right thumb. Hand got crushed by heavy marble table on 9/9/22 at his cabin. History of trigger thumb.  X-rays at the time demonstrated nondisplaced fractures of the distal and proximal phalanx of the thumb.  He also sustained a superficial wound on the volar aspect of the proximal phalanx near the IP joint.  He was started on Keflex which he has finished.  He states that he continues to ooze yellow crusty fluid.  He has been applying bacitracin to it.  He was given a splint but it is opening so he is just been babying the thumb.  Has been using Tylenol.   Swelling has improved.  Mild thumb tingling.  History of trigger thumb.      VITALS:  There were no vitals filed for this visit.    EXAM:  General: NAD,  A&Ox3  HEENT: NC/AT  CV: RRR by peripheral pulse  Pulmonary: Non-labored breathing on RA    RUE:  Scar over the volar aspect of the right thumb proximal phalanx fully healed  No erythema or fluctuance  No tenderness of the thumb proximal and distal phalanx    EPL and FPL are intact, IP joint remains quite stiff, approximately 20 deg of active flexion  Sensation is intact to light touch in median, radial, ulnar nerve distributions, no deficits at radial or ulnar borders of thumb ip  Warm well-perfused, capillary refill less than 2 seconds        IMAGING:  X-rays today demonstrate healed fracture of proximal phalanx. There also appears to be a fractured osteophyte off the radial base of the distal phalanx that is also healed.    I have personally reviewed the above images and labs.          IMPRESSION AND RECOMMENDATIONS:  Mr. Sina Stoner is a 57 year old male right hand dominant with right thumb crush injury, resulting in nondisplaced proximal and distal phalangeal fractures and a superficial volar wound, all of which have healed.    Main issue now is stiffness of the thumb IP joint.    The patient does have evidence of arthrosis at the IP joint at the time of injury that was likely aggravated by the injury and immobilization.    At this point options are limited. I did discuss MRI of the thumb IP joint with consideration of joint debridement of any interposed scar or tissue.  He is a  and would like to maintain flexibility of the IP joint. He has minimal IP pain so I do not think fusion is necessary.    He has many plans for the summer so wants to continue with exercises and will reevaluate in the fall.  He will let me know if he decides to proceed.    If the scar is still bothersome at that time, we can consider intralesional Kenalog injection.    All questions answered.  The patient voiced understanding agreement.    Larry Brar MD    Hand, Upper Extremity & Microvascular  Surgery  Department of Orthopaedic Surgery  [facility]        Again, thank you for allowing me to participate in the care of your patient.        Sincerely,        Larry Brar MD

## 2023-06-13 NOTE — PROGRESS NOTES
06/13/23 0500   Appointment Info   Treating Provider Cesilia Barr, OTR, CHT   Total/Authorized Visits 38 (POC)   Visits Used 30   Medical Diagnosis R thumb distal and proximal phalanx fractures   OT Tx Diagnosis R thumb distal and proximal phalanx fractures   Precautions/Limitations none   Progress Note/Certification   Onset of Illness/Injury or Date of Surgery 09/09/22   Therapy Frequency 1x/week   Predicted Duration 2 weeks   Progress Note Due Date 08/13/23   Progress Note Completed Date 06/13/23   OT Goal 1   Goal Identifier Household Chores   Goal Description Pt will be able to open a tight or new jar without pain   Rationale   (In order to maximize independence with ADLS)   Goal Progress Progressing slowly, extended goal due date   Target Date 08/13/23   Subjective Report   Subjective Report Feeling pretty good.  It still keeps loosening up.  There is still the nodule in there.   Objective Measures   Objective Measures Objective Measure 1;Objective Measure 2;Objective Measure 3   Objective Measure 1   Objective Measure AROM Thumb MP Ext/Flex   Details 0/23, p tx 27   Objective Measure 2   Objective Measure 3 Pt Pinch   Details L: 20 R: 19   Treatment Interventions (OT)   Interventions Therapeutic Procedure/Exercise;Manual Therapy;Orthotics   Therapeutic Procedure/Exercise   Therapeutic Procedure: strength, endurance, ROM, flexibillity minutes (15097) 24   Therapeutic Procedures Ther Proc 2   Ther Proc 1 PROM Thumb IP flexion   Ther Proc 1 - Details 20 reps, hold 15-20 sec   Ther Proc 2 AROM Thumb IP Blocking   Ther Proc 2 - Details 15 reps   Skilled Intervention Provided PROM with low load long duration stretch to improve tissue extensibility, cureing provided with blocking   Patient Response/Progress good   Manual Therapy   Manual Therapy Minutes (53441) 8   Manual Therapy Manual Therapy 2   Manual Therapy 1 A/P Thumb IP joint mob   Manual Therapy 1 - Details 10 reps, held for 5 sec   Manual Therapy 2 scar  mobilization   Manual Therapy 2 - Details circular, deep pressure to scar on volar aspect of thumb IP joint   Skilled Intervention To decrease scar tissue to allow for improved tendon gliding   Patient Response/Progress good   Education   Learner/Method No Barriers to Learning   Plan   Plan for next session A/PROM, scar mobilization   Total Session Time   Timed Code Treatment Minutes 32   Total Treatment Time (sum of timed and untimed services) 32         PLAN  Continue therapy per current plan of care.    Beginning/End Dates of Progress Note Reporting Period:  4/4/23  to 06/13/2023    Referring Provider:  No ref. provider found

## 2023-06-20 ENCOUNTER — THERAPY VISIT (OUTPATIENT)
Dept: OCCUPATIONAL THERAPY | Facility: CLINIC | Age: 58
End: 2023-06-20
Payer: COMMERCIAL

## 2023-06-20 DIAGNOSIS — S69.91XA INJURY OF RIGHT THUMB, INITIAL ENCOUNTER: Primary | ICD-10-CM

## 2023-06-20 DIAGNOSIS — M79.644 PAIN OF RIGHT THUMB: ICD-10-CM

## 2023-06-20 PROCEDURE — 97110 THERAPEUTIC EXERCISES: CPT | Mod: GO | Performed by: OCCUPATIONAL THERAPIST

## 2023-06-20 PROCEDURE — 97140 MANUAL THERAPY 1/> REGIONS: CPT | Mod: GO | Performed by: OCCUPATIONAL THERAPIST

## 2023-08-01 PROBLEM — S69.91XA INJURY OF RIGHT THUMB, INITIAL ENCOUNTER: Status: RESOLVED | Noted: 2022-09-25 | Resolved: 2023-08-01

## 2024-01-15 ENCOUNTER — OFFICE VISIT (OUTPATIENT)
Dept: PODIATRY | Facility: CLINIC | Age: 59
End: 2024-01-15
Payer: COMMERCIAL

## 2024-01-15 ENCOUNTER — ANCILLARY PROCEDURE (OUTPATIENT)
Dept: GENERAL RADIOLOGY | Facility: CLINIC | Age: 59
End: 2024-01-15
Attending: PODIATRIST
Payer: COMMERCIAL

## 2024-01-15 VITALS — BODY MASS INDEX: 30.85 KG/M2 | SYSTOLIC BLOOD PRESSURE: 117 MMHG | DIASTOLIC BLOOD PRESSURE: 77 MMHG | WEIGHT: 215 LBS

## 2024-01-15 DIAGNOSIS — M89.8X6 PAIN OF LEFT FIBULA: Primary | ICD-10-CM

## 2024-01-15 DIAGNOSIS — M89.8X6 PAIN OF LEFT FIBULA: ICD-10-CM

## 2024-01-15 PROCEDURE — 99203 OFFICE O/P NEW LOW 30 MIN: CPT | Performed by: PODIATRIST

## 2024-01-15 PROCEDURE — 73610 X-RAY EXAM OF ANKLE: CPT | Mod: TC | Performed by: RADIOLOGY

## 2024-01-15 NOTE — PATIENT INSTRUCTIONS
Thank you for choosing [facility] Podiatry / Foot & Ankle Surgery!    DR. MCADAMS'S CLINIC LOCATIONS:     [facility] (Friday) TRIAGE LINE: [phone] [address] [facility]  APPOINTMENTS: [phone]   [place] [address] RADIOLOGY: [phone]    PHYSICAL THERAPY: [phone]    SET UP SURGERY: [phone]   [place] (Mon-Tues AM-Thurs) BILLING QUESTIONS: [phone] [address] FAX: [phone]   [place] [address] [place] [facility]: [phone]     You are seen today for pain along the lateral left ankle.  Your exam is consistent with pain in the fibula.  X-rays do not show any fracture or stress reactions.  Recommendations:    1.  Comfortable shoes;    2.  Modify activities based on pain level;    3.  Resting/icing/anti-inflammatories.    The next step to consider if symptoms fail to respond to the above plan will be a short course in a walking cast boot.  We would also consider advanced imaging, such as an MRI.  If symptoms continue to improve, no further follow-up or intervention is necessary.  If symptoms stagnate, follow-up in clinic for reassessment and further options    PRICE Therapy    Many aches and pains throughout the foot and ankle can be helped with many simple treatments.  This is usually described as PRICE Therapy.      P - Protection - often times, inflammation/pain in the lower extremity is not able to improve simply because the areas involved are never allowed to rest.  Every step we take can bother the problematic area.  Protecting those areas is an important step in the healing process.  This may involve a walking cast boot, a special insert/orthotic device, an ankle brace, or simply avoiding barefoot walking.    R - Rest - in addition to protecting the foot/ankle, resting is an important, but often times difficult, treatment option.  Getting off your feet when they bother you, and specifically avoiding activities that cause pain/discomfort, are  "very beneficial to prevent, and treat, foot/ankle pain.  \"If there's something that makes it hurt(eg activities, shoe gear), and you keep doing the thing that makes it hurt, it's just going to keep hurting\".      I - Ice - icing regularly can help to decrease inflammation and swelling in the foot, thus decreasing pain.  Using an ice pack or a bag of frozen peas works very well.  Ice for 20 minutes multiple times per day as needed.  Do not place the ice directly on the skin as this can cause tissue damage.    C - Compression - using a compression wrap or an ACE wrap can help to decrease swelling, which can help to decrease pain.  Wearing the wraps is generally not needed at night, but they should be worn on a regular basis when you are going to be on your feet for prolonged periods as gravity tends to pull fluids down to your feet/ankles.    E - Elevation - elevating your lower extremities multiple times daily for 15-20 minutes can help to decrease swelling, which works well in decreasing pain levels.      NSAID/Tylenol - An anti-inflammatory, like Aleve or ibuprofen, and/or a pain medication, such as Tylenol, can help to improve pain levels and get the issue resolved sooner rather than later.  Also, topical anti-inflammatory medications like Voltaren gel can be used for local treatment, with the benefit of avoiding system issues with oral medications.  Anyone with liver issues should be careful with Tylenol, and anyone with high blood pressure or heart, stomach or kidney issues should be careful with anti-inflammatories.  Please ask if you have questions about these medications, including dosage.      "

## 2024-01-15 NOTE — LETTER
"    1/15/2024         RE: Sina Stoner  [address] [facility] [address]        Dear Colleague,    Thank you for referring your patient, Sina Stoner, to the [facility] PODI[facility]RY. Please see a copy of my visit note below.    Foot & Ankle Surgery  January 15, 2024    CC: \"Pain in left ankle\"    I was asked to see Sina Stoner regarding the chief complaint by:  self    HPI:  Pt is a 58 year old male who presents with above complaint.  2 weeks history of lateral left ankle pain.  \"I believe I have gout in my ankle\".  The patient has a history of high uric acid but denies any specific acute gouty attacks.  No clear injury per patient report.  He states this started \"swelling up\" laterally and by the end of the day recently, he was having difficulty bearing weight and walking.  Pain 10 out of 10 at its worst, symptoms come and go and worse with weightbearing.  \"Elevate, ice, Advil\" for treatment.    ROS:   Pos for CC.  The patient denies current nausea, vomiting, chills, fevers, belly pain, calf pain, chest pain or SOB.  Complete remainder of ROS is otherwise neg.    VITALS:    Vitals:    01/15/24 0929   BP: 117/77   Weight: 97.5 kg (215 lb)       PMH:    Past Medical History:   Diagnosis Date     Depressive disorder, not elsewhere classified      Supraspinatus tendonitis 4/1/2011       SXHX:    Past Surgical History:   Procedure Laterality Date     COLONOSCOPY N/A 03/27/2015    Procedure: COLONOSCOPY;  Surgeon: Brien Lorenzana MD;  Location:  GI     HAND SURGERY Right 1984    right 5th metacarpal repair     RELEASE TRIGGER FINGER Left 12/18/2019    Left Trigger Thumb Release at  [facility]     TONSILLECTOMY  1971        MEDS:    Current Outpatient Medications   Medication     atorvastatin (LIPITOR) 20 MG tablet     Cholecalciferol (VITAMIN D) 1000 UNIT capsule     finasteride (PROPECIA) 1 MG tablet     Multiple Vitamin (MULTIVITAMINS PO) "     sildenafil (VIAGRA) 50 MG tablet     No current facility-administered medications for this visit.       ALL:   No Known Allergies    FMH:    Family History   Problem Relation Age of Onset     Eye Disorder Mother      Psychotic Disorder Mother         dementia      Gastrointestinal Disease Mother      Alzheimer Disease Mother      Arthritis Mother      Osteoporosis Mother      Hyperlipidemia Father      Coronary Artery Disease Father         MI at 53 yo     No Known Problems Brother      Hypertension Brother      Alzheimer Disease Maternal Grandmother      Cancer Maternal Grandfather      No Known Problems Daughter      No Known Problems Daughter      Prostate Cancer No family hx of      Colon Cancer No family hx of      Diabetes No family hx of      Breast Cancer No family hx of      Cerebrovascular Disease No family hx of        SocHx:    Social History     Socioeconomic History     Marital status:      Spouse name: Devora     Number of children: 2     Years of education: Not on file     Highest education level: Not on file   Occupational History     Occupation: [employer]     Employer: [employer]   Tobacco Use     Smoking status: Never     Smokeless tobacco: Never   Vaping Use     Vaping Use: Never used   Substance and Sexual Activity     Alcohol use: Yes     Alcohol/week: 0.0 standard drinks of alcohol     Comment: 0-2 beers per week     Drug use: No     Sexual activity: Yes     Partners: Female     Birth control/protection: Male Surgical   Other Topics Concern      Service Not Asked     Blood Transfusions No     Caffeine Concern Yes     Comment: 3-4 serv/day     Occupational Exposure Not Asked     Hobby Hazards Not Asked     Sleep Concern No     Stress Concern Not Asked     Weight Concern Not Asked     Special Diet Not Asked     Back Care Not Asked     Exercise Yes     Comment: walks regularily     Bike Helmet Not Asked     Seat Belt Yes     Self-Exams Not Asked     Parent/sibling w/  CABG, MI or angioplasty before 65F 55M? Yes     Comment: father   Social History Narrative     Not on file     Social Determinants of Health     Financial Resource Strain: Not on file   Food Insecurity: Not on file   Transportation Needs: Not on file   Physical Activity: Not on file   Stress: Not on file   Social Connections: Not on file   Interpersonal Safety: Not on file   Housing Stability: Not on file           EXAMINATION:  Gen:   No apparent distress  Neuro:   A&Ox3, no deficits  Psych:    Answering questions appropriately for age and situation with normal affect  Head:    NCAT  Eye:    Visual scanning without deficit  Ear:    Response to auditory stimuli wnl  Lung:    Non-labored breathing on RA noted  Abd:    NTND per patient report  Lymph:    Mild lateral left ankle swelling  Vasc:    Pulses palpable, CFT minimally delayed  Neuro:    Light touch sensation intact to all sensory nerve distributions without paresthesias  Derm:    Neg for nodules, lesions or ulcerations  MSK:    The patient is point tender at the left distal fibula.  Peroneal tendons, sinus tarsi unremarkable for pain.  No clear lateral ankle ligament pain.  Calf:    Neg for redness, swelling or tenderness      Imaging: 3 views weightbearing left ankle -evidence of previous ankle sprains as there are fragments along the medial malleolus.  No acute pathology is seen.  Specifically, no distal fibula fracture/stress reaction    Assessment:  58 year old male with left distal fibular stress reaction      Medical Decision Making/Plan:  Discussed etiologies, anatomy and options  1.  Left distal fibular stress reaction  -I personally reviewed and interpreted the patient's lower extremity history pertinent to today's visit, including imaging/labs, in preparation for initiating a treatment program.  -I personally reviewed and interpreted the x-ray results  -Crosstraining/activity modification based on pain  -RICE/NSAID versus Tylenol as needed based on  pain  -We discussed the option for walking cast boot for further stress relief on the fibula.  He does not think this is necessary.  Consider this, as well as MRI, if symptoms fail to respond        Follow up:  prn or sooner with acute issues      Patient's medical history was reviewed today      Nacho Bueno DPM FACFAS FACFAOM  Podiatric Foot & Ankle Surgeon  [facility]  [phone]    Disclaimer: This note consists of symbols derived from keyboarding, dictation and/or voice recognition software. As a result, there may be errors in the script that have gone undetected. Please consider this when interpreting information found in this chart.          Again, thank you for allowing me to participate in the care of your patient.        Sincerely,        Nacho Bueno DPM, JODIE

## 2024-01-15 NOTE — PROGRESS NOTES
"Foot & Ankle Surgery  January 15, 2024    CC: \"Pain in left ankle\"    I was asked to see Sina Stoner regarding the chief complaint by:  self    HPI:  Pt is a 58 year old male who presents with above complaint.  2 weeks history of lateral left ankle pain.  \"I believe I have gout in my ankle\".  The patient has a history of high uric acid but denies any specific acute gouty attacks.  No clear injury per patient report.  He states this started \"swelling up\" laterally and by the end of the day recently, he was having difficulty bearing weight and walking.  Pain 10 out of 10 at its worst, symptoms come and go and worse with weightbearing.  \"Elevate, ice, Advil\" for treatment.    ROS:   Pos for CC.  The patient denies current nausea, vomiting, chills, fevers, belly pain, calf pain, chest pain or SOB.  Complete remainder of ROS is otherwise neg.    VITALS:    Vitals:    01/15/24 0929   BP: 117/77   Weight: 97.5 kg (215 lb)       PMH:    Past Medical History:   Diagnosis Date    Depressive disorder, not elsewhere classified     Supraspinatus tendonitis 4/1/2011       SXHX:    Past Surgical History:   Procedure Laterality Date    COLONOSCOPY N/A 03/27/2015    Procedure: COLONOSCOPY;  Surgeon: Brien Lorenzana MD;  Location:  GI    HAND SURGERY Right 1984    right 5th metacarpal repair    RELEASE TRIGGER FINGER Left 12/18/2019    Left Trigger Thumb Release at [address], [facility]    TONSILLECTOMY  1971        MEDS:    Current Outpatient Medications   Medication    atorvastatin (LIPITOR) 20 MG tablet    Cholecalciferol (VITAMIN D) 1000 UNIT capsule    finasteride (PROPECIA) 1 MG tablet    Multiple Vitamin (MULTIVITAMINS PO)    sildenafil (VIAGRA) 50 MG tablet     No current facility-administered medications for this visit.       ALL:   No Known Allergies    FMH:    Family History   Problem Relation Age of Onset    Eye Disorder Mother     Psychotic Disorder Mother         dementia     " Gastrointestinal Disease Mother     Alzheimer Disease Mother     Arthritis Mother     Osteoporosis Mother     Hyperlipidemia Father     Coronary Artery Disease Father         MI at 51 yo    No Known Problems Brother     Hypertension Brother     Alzheimer Disease Maternal Grandmother     Cancer Maternal Grandfather     No Known Problems Daughter     No Known Problems Daughter     Prostate Cancer No family hx of     Colon Cancer No family hx of     Diabetes No family hx of     Breast Cancer No family hx of     Cerebrovascular Disease No family hx of        SocHx:    Social History     Socioeconomic History    Marital status:      Spouse name: Devora    Number of children: 2    Years of education: Not on file    Highest education level: Not on file   Occupational History    Occupation: [employer]     Employer: [employer]   Tobacco Use    Smoking status: Never    Smokeless tobacco: Never   Vaping Use    Vaping Use: Never used   Substance and Sexual Activity    Alcohol use: Yes     Alcohol/week: 0.0 standard drinks of alcohol     Comment: 0-2 beers per week    Drug use: No    Sexual activity: Yes     Partners: Female     Birth control/protection: Male Surgical   Other Topics Concern     Service Not Asked    Blood Transfusions No    Caffeine Concern Yes     Comment: 3-4 serv/day    Occupational Exposure Not Asked    Hobby Hazards Not Asked    Sleep Concern No    Stress Concern Not Asked    Weight Concern Not Asked    Special Diet Not Asked    Back Care Not Asked    Exercise Yes     Comment: walks regularily    Bike Helmet Not Asked    Seat Belt Yes    Self-Exams Not Asked    Parent/sibling w/ CABG, MI or angioplasty before 65F 55M? Yes     Comment: father   Social History Narrative    Not on file     Social Determinants of Health     Financial Resource Strain: Not on file   Food Insecurity: Not on file   Transportation Needs: Not on file   Physical Activity: Not on file   Stress: Not on file    Social Connections: Not on file   Interpersonal Safety: Not on file   Housing Stability: Not on file           EXAMINATION:  Gen:   No apparent distress  Neuro:   A&Ox3, no deficits  Psych:    Answering questions appropriately for age and situation with normal affect  Head:    NCAT  Eye:    Visual scanning without deficit  Ear:    Response to auditory stimuli wnl  Lung:    Non-labored breathing on RA noted  Abd:    NTND per patient report  Lymph:    Mild lateral left ankle swelling  Vasc:    Pulses palpable, CFT minimally delayed  Neuro:    Light touch sensation intact to all sensory nerve distributions without paresthesias  Derm:    Neg for nodules, lesions or ulcerations  MSK:    The patient is point tender at the left distal fibula.  Peroneal tendons, sinus tarsi unremarkable for pain.  No clear lateral ankle ligament pain.  Calf:    Neg for redness, swelling or tenderness      Imaging: 3 views weightbearing left ankle -evidence of previous ankle sprains as there are fragments along the medial malleolus.  No acute pathology is seen.  Specifically, no distal fibula fracture/stress reaction    Assessment:  58 year old male with left distal fibular stress reaction      Medical Decision Making/Plan:  Discussed etiologies, anatomy and options  1.  Left distal fibular stress reaction  -I personally reviewed and interpreted the patient's lower extremity history pertinent to today's visit, including imaging/labs, in preparation for initiating a treatment program.  -I personally reviewed and interpreted the x-ray results  -Crosstraining/activity modification based on pain  -RICE/NSAID versus Tylenol as needed based on pain  -We discussed the option for walking cast boot for further stress relief on the fibula.  He does not think this is necessary.  Consider this, as well as MRI, if symptoms fail to respond        Follow up:  prn or sooner with acute issues      Patient's medical history was reviewed today      Nacho HIRSCH  JODIE Bueno [facility] FACFAOM  Podiatric Foot & Ankle Surgeon  [facility]  [phone]    Disclaimer: This note consists of symbols derived from keyboarding, dictation and/or voice recognition software. As a result, there may be errors in the script that have gone undetected. Please consider this when interpreting information found in this chart.

## 2024-04-08 ENCOUNTER — OFFICE VISIT (OUTPATIENT)
Dept: FAMILY MEDICINE | Facility: CLINIC | Age: 59
End: 2024-04-08
Attending: FAMILY MEDICINE
Payer: COMMERCIAL

## 2024-04-08 VITALS
WEIGHT: 221 LBS | SYSTOLIC BLOOD PRESSURE: 112 MMHG | HEIGHT: 70 IN | RESPIRATION RATE: 16 BRPM | TEMPERATURE: 97.1 F | OXYGEN SATURATION: 98 % | DIASTOLIC BLOOD PRESSURE: 60 MMHG | BODY MASS INDEX: 31.64 KG/M2 | HEART RATE: 71 BPM

## 2024-04-08 DIAGNOSIS — Z13.0 SCREENING, DEFICIENCY ANEMIA, IRON: ICD-10-CM

## 2024-04-08 DIAGNOSIS — Z00.00 ROUTINE GENERAL MEDICAL EXAMINATION AT A HEALTH CARE FACILITY: Primary | ICD-10-CM

## 2024-04-08 DIAGNOSIS — Z12.5 SCREENING FOR PROSTATE CANCER: ICD-10-CM

## 2024-04-08 DIAGNOSIS — E78.5 HYPERLIPIDEMIA LDL GOAL <100: ICD-10-CM

## 2024-04-08 DIAGNOSIS — L65.9 ALOPECIA: ICD-10-CM

## 2024-04-08 DIAGNOSIS — N52.9 ERECTILE DYSFUNCTION, UNSPECIFIED ERECTILE DYSFUNCTION TYPE: ICD-10-CM

## 2024-04-08 LAB
ALBUMIN SERPL BCG-MCNC: 4.4 G/DL (ref 3.5–5.2)
ALP SERPL-CCNC: 124 U/L (ref 40–150)
ALT SERPL W P-5'-P-CCNC: 31 U/L (ref 0–70)
ANION GAP SERPL CALCULATED.3IONS-SCNC: 11 MMOL/L (ref 7–15)
AST SERPL W P-5'-P-CCNC: 25 U/L (ref 0–45)
BILIRUB SERPL-MCNC: 0.8 MG/DL
BUN SERPL-MCNC: 15.6 MG/DL (ref 6–20)
CALCIUM SERPL-MCNC: 9 MG/DL (ref 8.6–10)
CHLORIDE SERPL-SCNC: 107 MMOL/L (ref 98–107)
CHOLEST SERPL-MCNC: 194 MG/DL
CREAT SERPL-MCNC: 1.24 MG/DL (ref 0.67–1.17)
DEPRECATED HCO3 PLAS-SCNC: 23 MMOL/L (ref 22–29)
EGFRCR SERPLBLD CKD-EPI 2021: 67 ML/MIN/1.73M2
ERYTHROCYTE [DISTWIDTH] IN BLOOD BY AUTOMATED COUNT: 12.3 % (ref 10–15)
FASTING STATUS PATIENT QL REPORTED: YES
GLUCOSE SERPL-MCNC: 112 MG/DL (ref 70–99)
HCT VFR BLD AUTO: 44.1 % (ref 40–53)
HDLC SERPL-MCNC: 41 MG/DL
HGB BLD-MCNC: 14.8 G/DL (ref 13.3–17.7)
LDLC SERPL CALC-MCNC: 113 MG/DL
MCH RBC QN AUTO: 29.6 PG (ref 26.5–33)
MCHC RBC AUTO-ENTMCNC: 33.6 G/DL (ref 31.5–36.5)
MCV RBC AUTO: 88 FL (ref 78–100)
NONHDLC SERPL-MCNC: 153 MG/DL
PLATELET # BLD AUTO: 212 10E3/UL (ref 150–450)
POTASSIUM SERPL-SCNC: 4.3 MMOL/L (ref 3.4–5.3)
PROT SERPL-MCNC: 7 G/DL (ref 6.4–8.3)
PSA SERPL DL<=0.01 NG/ML-MCNC: 0.44 NG/ML (ref 0–3.5)
RBC # BLD AUTO: 5 10E6/UL (ref 4.4–5.9)
SODIUM SERPL-SCNC: 141 MMOL/L (ref 135–145)
TRIGL SERPL-MCNC: 199 MG/DL
WBC # BLD AUTO: 6.3 10E3/UL (ref 4–11)

## 2024-04-08 PROCEDURE — 80061 LIPID PANEL: CPT | Performed by: FAMILY MEDICINE

## 2024-04-08 PROCEDURE — G0103 PSA SCREENING: HCPCS | Performed by: FAMILY MEDICINE

## 2024-04-08 PROCEDURE — 36415 COLL VENOUS BLD VENIPUNCTURE: CPT | Performed by: FAMILY MEDICINE

## 2024-04-08 PROCEDURE — 85027 COMPLETE CBC AUTOMATED: CPT | Performed by: FAMILY MEDICINE

## 2024-04-08 PROCEDURE — 99396 PREV VISIT EST AGE 40-64: CPT | Performed by: FAMILY MEDICINE

## 2024-04-08 PROCEDURE — 80053 COMPREHEN METABOLIC PANEL: CPT | Performed by: FAMILY MEDICINE

## 2024-04-08 PROCEDURE — 99213 OFFICE O/P EST LOW 20 MIN: CPT | Mod: 25 | Performed by: FAMILY MEDICINE

## 2024-04-08 RX ORDER — FINASTERIDE 1 MG/1
1 TABLET, FILM COATED ORAL DAILY
Qty: 90 TABLET | Refills: 3 | Status: SHIPPED | OUTPATIENT
Start: 2024-04-08

## 2024-04-08 RX ORDER — SILDENAFIL 50 MG/1
50 TABLET, FILM COATED ORAL DAILY PRN
Qty: 30 TABLET | Refills: 11 | Status: SHIPPED | OUTPATIENT
Start: 2024-04-08

## 2024-04-08 RX ORDER — ATORVASTATIN CALCIUM 20 MG/1
20 TABLET, FILM COATED ORAL DAILY
Qty: 90 TABLET | Refills: 3 | Status: SHIPPED | OUTPATIENT
Start: 2024-04-08

## 2024-04-08 SDOH — HEALTH STABILITY: PHYSICAL HEALTH: ON AVERAGE, HOW MANY DAYS PER WEEK DO YOU ENGAGE IN MODERATE TO STRENUOUS EXERCISE (LIKE A BRISK WALK)?: 5 DAYS

## 2024-04-08 ASSESSMENT — ANXIETY QUESTIONNAIRES
GAD7 TOTAL SCORE: 0
2. NOT BEING ABLE TO STOP OR CONTROL WORRYING: NOT AT ALL
1. FEELING NERVOUS, ANXIOUS, OR ON EDGE: NOT AT ALL
8. IF YOU CHECKED OFF ANY PROBLEMS, HOW DIFFICULT HAVE THESE MADE IT FOR YOU TO DO YOUR WORK, TAKE CARE OF THINGS AT HOME, OR GET ALONG WITH OTHER PEOPLE?: NOT DIFFICULT AT ALL
IF YOU CHECKED OFF ANY PROBLEMS ON THIS QUESTIONNAIRE, HOW DIFFICULT HAVE THESE PROBLEMS MADE IT FOR YOU TO DO YOUR WORK, TAKE CARE OF THINGS AT HOME, OR GET ALONG WITH OTHER PEOPLE: NOT DIFFICULT AT ALL
5. BEING SO RESTLESS THAT IT IS HARD TO SIT STILL: NOT AT ALL
7. FEELING AFRAID AS IF SOMETHING AWFUL MIGHT HAPPEN: NOT AT ALL
3. WORRYING TOO MUCH ABOUT DIFFERENT THINGS: NOT AT ALL
GAD7 TOTAL SCORE: 0
7. FEELING AFRAID AS IF SOMETHING AWFUL MIGHT HAPPEN: NOT AT ALL
GAD7 TOTAL SCORE: 0
4. TROUBLE RELAXING: NOT AT ALL
6. BECOMING EASILY ANNOYED OR IRRITABLE: NOT AT ALL

## 2024-04-08 ASSESSMENT — PATIENT HEALTH QUESTIONNAIRE - PHQ9
10. IF YOU CHECKED OFF ANY PROBLEMS, HOW DIFFICULT HAVE THESE PROBLEMS MADE IT FOR YOU TO DO YOUR WORK, TAKE CARE OF THINGS AT HOME, OR GET ALONG WITH OTHER PEOPLE: NOT DIFFICULT AT ALL
SUM OF ALL RESPONSES TO PHQ QUESTIONS 1-9: 2
SUM OF ALL RESPONSES TO PHQ QUESTIONS 1-9: 2

## 2024-04-08 ASSESSMENT — SOCIAL DETERMINANTS OF HEALTH (SDOH): HOW OFTEN DO YOU GET TOGETHER WITH FRIENDS OR RELATIVES?: ONCE A WEEK

## 2024-04-08 NOTE — RESULT ENCOUNTER NOTE
Dear Quirino,    Here is a summary of your recent test results:  -Normal red blood cell (hgb) levels, normal white blood cell count and normal platelet levels.  -PSA (prostate specific antigen) test is normal.  This indicates a low likelihood of prostate cancer.  ADVISE: rechecking this in 1 year.  -Cholesterol levels are above your goal levels.  ADVISE: continuing your medication, a regular exercise program with at least 150 minutes of aerobic exercise per week, and eating a low saturated fat/low carbohydrate diet.  Also, you should recheck this fasting cholesterol panel in 12 months.  -Liver and gallbladder tests (ALT,AST, Alk phos,bilirubin) are normal.  -Kidney function (GFR) is normal.  -Sodium is normal.  -Potassium is normal.  -Calcium is normal.  -Glucose is slight elevated and may be a sign of early diabetes (prediabetes). ADVISE:: eating a low carbohydrate diet, exercising, trying to lose weight (if necessary) and rechecking your glucose level in 12 months.    For additional lab test information, www.testing.com is a very good reference.    In addition, here is a list of due or overdue Health Maintenance reminders:  Zoster (Shingles) Vaccine(1 of 2) Never done    Please call us at [phone] (or use [employer]) to address the above recommendations if needed.           Thank you very much for trusting me and [facility].     Have a peaceful day.    Healthy regards,  Alonzo Herrera MD

## 2024-04-08 NOTE — PROGRESS NOTES
"Preventive Care Visit  [facility]  Sina Herrera MD, Family Medicine  Apr 8, 2024        Assessment & Plan     Routine general medical examination at a health care facility      Hyperlipidemia LDL goal <100  Controlled - continue medication.   - COMPREHENSIVE METABOLIC PANEL  - Lipid panel reflex to direct LDL Fasting  - atorvastatin (LIPITOR) 20 MG tablet  Dispense: 90 tablet; Refill: 3    Alopecia  Controlled - continue medication.   - finasteride (PROPECIA) 1 MG tablet  Dispense: 90 tablet; Refill: 3    Erectile dysfunction, unspecified erectile dysfunction type  Controlled - continue medication.   - sildenafil (VIAGRA) 50 MG tablet  Dispense: 30 tablet; Refill: 11    Screening, deficiency anemia, iron    - CBC with Platelets    Screening for prostate cancer  - PROSTATE SPEC ANTIGEN SCREEN      BMI  Estimated body mass index is 31.71 kg/m  as calculated from the following:    Height as of this encounter: 1.778 m (5' 10\").    Weight as of this encounter: 100.2 kg (221 lb).   Weight management plan: Discussed healthy diet and exercise guidelines      Reviewed preventive health counseling, as reflected in patient instructions    No follow-ups on file.    Follow-up Visit   Expected date:  Apr 08, 2025 (Approximate)      Follow Up Appointment Details:     Follow-up with whom?: PCP    Follow-Up for what?: Adult Preventive    Any Additional Chronic Condition Management?: Hyperlipidemia    How?: In Person                         Alonzo Herrera MD     [address] [address]  [employer]     Office: [phone]         Subjective   Quirino is a 58 year old, presenting for the following:  Physical      Health Care Directive  Patient does not have a Health Care Directive or Living Will: Discussed advance care planning with patient; however, patient declined at this time.    HPI      Hyperlipidemia Follow-Up    Are you regularly taking any " medication or supplement to lower your cholesterol?   Yes- atorvastatin  Are you having muscle aches or other side effects that you think could be caused by your cholesterol lowering medication?  No        4/8/2024   General Health   How would you rate your overall physical health? Good   Feel stress (tense, anxious, or unable to sleep) Only a little   (!) STRESS CONCERN      4/8/2024   Nutrition   Three or more servings of calcium each day? Yes   Diet: Low fat/cholesterol    Breakfast skipped   How many servings of fruit and vegetables per day? (!) 2-3   How many sweetened beverages each day? 0-1         4/8/2024   Exercise   Days per week of moderate/strenous exercise 5 days         4/8/2024   Social Factors   Frequency of gathering with friends or relatives Once a week   Worry food won't last until get money to buy more No   Food not last or not have enough money for food? No   Do you have housing?  Yes   Are you worried about losing your housing? No   Lack of transportation? No   Unable to get utilities (heat,electricity)? No         4/8/2024   Dental   Dentist two times every year? Yes         4/8/2024   TB Screening   Were you born outside of the [place]? No       Today's PHQ-9 Score:       4/8/2024     7:39 AM   PHQ-9 SCORE   PHQ-9 Total Score MyChart 2 (Minimal depression)   PHQ-9 Total Score 2         4/8/2024   Substance Use   Alcohol more than 3/day or more than 7/wk No   Do you use any other substances recreationally? (!) ALCOHOL    (!) CANNABIS PRODUCTS     Social History     Tobacco Use    Smoking status: Never    Smokeless tobacco: Never   Vaping Use    Vaping Use: Never used   Substance Use Topics    Alcohol use: Yes     Alcohol/week: 0.0 standard drinks of alcohol     Comment: 0-2 beers per week    Drug use: No           4/8/2024   STI Screening   New sexual partner(s) since last STI/HIV test? No   Last PSA:   PSA   Date Value Ref Range Status   11/20/2020 0.77 0 - 4 ug/L Final     Comment:     Assay  "Method:  Chemiluminescence using Siemens Vista analyzer     Prostate Specific Antigen Screen   Date Value Ref Range Status   03/30/2023 0.64 0.00 - 3.50 ng/mL Final   02/11/2022 0.59 0.00 - 4.00 ug/L Final          Reviewed and updated as needed this visit by Provider   Tobacco  Allergies  Meds  Problems  Med Hx  Surg Hx  Fam Hx             Objective    Exam  /60   Pulse 71   Temp 97.1  F (36.2  C) (Tympanic)   Resp 16   Ht 1.778 m (5' 10\")   Wt 100.2 kg (221 lb)   SpO2 98%   BMI 31.71 kg/m     Estimated body mass index is 31.71 kg/m  as calculated from the following:    Height as of this encounter: 1.778 m (5' 10\").    Weight as of this encounter: 100.2 kg (221 lb).    Physical Exam  GENERAL: healthy, alert and no distress  EYES: Eyes grossly normal to inspection, PERRL and conjunctivae and sclerae normal  HENT: ear canals and TM's normal, nose and mouth without ulcers or lesions  NECK: no adenopathy, no asymmetry, masses, or scars and thyroid normal to palpation  RESP: lungs clear to auscultation - no rales, rhonchi or wheezes  BREAST: normal without masses, tenderness or nipple discharge and no palpable axillary masses or adenopathy  CV: regular rate and rhythm, normal S1 S2, no S3 or S4, no murmur, click or rub, no peripheral edema and peripheral pulses strong  ABDOMEN: soft, nontender, no hepatosplenomegaly, no masses and bowel sounds normal   (male): normal male genitalia without lesions or urethral discharge, no hernia  MS: no gross musculoskeletal defects noted, no edema  SKIN: no suspicious lesions or rashes  NEURO: Normal strength and tone, mentation intact and speech normal  PSYCH: mentation appears normal, affect normal/bright  LYMPH: no cervical, supraclavicular, axillary, or inguinal adenopathy  RECTAL: declined exam      Signed Electronically by: Sina Herrera MD    "

## 2024-04-08 NOTE — PATIENT INSTRUCTIONS
Preventive Care Advice   This is general advice given by our system to help you stay healthy. However, your care team may have specific advice just for you. Please talk to your care team about your preventive care needs.  Nutrition  Eat 5 or more servings of fruits and vegetables each day.  Try wheat bread, brown rice and whole grain pasta (instead of white bread, rice, and pasta).  Get enough calcium and vitamin D. Check the label on foods and aim for 100% of the RDA (recommended daily allowance).  Lifestyle  Exercise at least 150 minutes each week   (30 minutes a day, 5 days a week).  Do muscle strengthening activities 2 days a week. These help control your weight and prevent disease.  No smoking.  Wear sunscreen to prevent skin cancer.  Have a dental exam and cleaning every 6 months.  Yearly exams  See your health care team every year to talk about:  Any changes in your health.  Any medicines your care team has prescribed.  Preventive care, family planning, and ways to prevent chronic diseases.  Shots (vaccines)   HPV shots (up to age 26), if you've never had them before.  Hepatitis B shots (up to age 59), if you've never had them before.  COVID-19 shot: Get this shot when it's due.  Flu shot: Get a flu shot every year.  Tetanus shot: Get a tetanus shot every 10 years.  Pneumococcal, hepatitis A, and RSV shots: Ask your care team if you need these based on your risk.  Shingles shot (for age 50 and up).  General health tests  Diabetes screening:  Starting at age 35, Get screened for diabetes at least every 3 years.  If you are younger than age 35, ask your care team if you should be screened for diabetes.  Cholesterol test: At age 39, start having a cholesterol test every 5 years, or more often if advised.  Bone density scan (DEXA): At age 50, ask your care team if you should have this scan for osteoporosis (brittle bones).  Hepatitis C: Get tested at least once in your life.  STIs (sexually transmitted  infections)  Before age 24: Ask your care team if you should be screened for STIs.  After age 24: Get screened for STIs if you're at risk. You are at risk for STIs (including HIV) if:  You are sexually active with more than one person.  You don't use condoms every time.  You or a partner was diagnosed with a sexually transmitted infection.  If you are at risk for HIV, ask about PrEP medicine to prevent HIV.  Get tested for HIV at least once in your life, whether you are at risk for HIV or not.  Cancer screening tests  Cervical cancer screening: If you have a cervix, begin getting regular cervical cancer screening tests at age 21. Most people who have regular screenings with normal results can stop after age 65. Talk about this with your provider.  Breast cancer scan (mammogram): If you've ever had breasts, begin having regular mammograms starting at age 40. This is a scan to check for breast cancer.  Colon cancer screening: It is important to start screening for colon cancer at age 45.  Have a colonoscopy test every 10 years (or more often if you're at risk) Or, ask your provider about stool tests like a FIT test every year or Cologuard test every 3 years.  To learn more about your testing options, visit: https://www.[employer]/882134.pdf.  For help making a decision, visit: https://bit.ly/vw72008.  Prostate cancer screening test: If you have a prostate and are age 55 to 69, ask your provider if you would benefit from a yearly prostate cancer screening test.  Lung cancer screening: If you are a current or former smoker age 50 to 80, ask your care team if ongoing lung cancer screenings are right for you.  For informational purposes only. Not to replace the advice of your health care provider. Copyright   2023 [employer]. All rights reserved. Clinically reviewed by the [facility] Transitions Program. [place] [id number] - REV 01/24.    Learning About Stress  What is stress?     Stress is your  body's response to a hard situation. Your body can have a physical, emotional, or mental response. Stress is a fact of life for most people, and it affects everyone differently. What causes stress for you may not be stressful for someone else.  A lot of things can cause stress. You may feel stress when you go on a job interview, take a test, or run a race. This kind of short-term stress is normal and even useful. It can help you if you need to work hard or react quickly. For example, stress can help you finish an important job on time.  Long-term stress is caused by ongoing stressful situations or events. Examples of long-term stress include long-term health problems, ongoing problems at work, or conflicts in your family. Long-term stress can harm your health.  How does stress affect your health?  When you are stressed, your body responds as though you are in danger. It makes hormones that speed up your heart, make you breathe faster, and give you a burst of energy. This is called the fight-or-flight stress response. If the stress is over quickly, your body goes back to normal and no harm is done.  But if stress happens too often or lasts too long, it can have bad effects. Long-term stress can make you more likely to get sick, and it can make symptoms of some diseases worse. If you tense up when you are stressed, you may develop neck, shoulder, or low back pain. Stress is linked to high blood pressure and heart disease.  Stress also harms your emotional health. It can make you najera, tense, or depressed. Your relationships may suffer, and you may not do well at work or school.  What can you do to manage stress?  You can try these things to help manage stress:   Do something active. Exercise or activity can help reduce stress. Walking is a great way to get started. Even everyday activities such as housecleaning or yard work can help.  Try yoga or crys chi. These techniques combine exercise and meditation. You may need  some training at first to learn them.  Do something you enjoy. For example, listen to music or go to a movie. Practice your hobby or do volunteer work.  Meditate. This can help you relax, because you are not worrying about what happened before or what may happen in the future.  Do guided imagery. Imagine yourself in any setting that helps you feel calm. You can use online videos, books, or a teacher to guide you.  Do breathing exercises. For example:  From a standing position, bend forward from the waist with your knees slightly bent. Let your arms dangle close to the floor.  Breathe in slowly and deeply as you return to a standing position. Roll up slowly and lift your head last.  Hold your breath for just a few seconds in the standing position.  Breathe out slowly and bend forward from the waist.  Let your feelings out. Talk, laugh, cry, and express anger when you need to. Talking with supportive friends or family, a counselor, or a philip leader about your feelings is a healthy way to relieve stress. Avoid discussing your feelings with people who make you feel worse.  Write. It may help to write about things that are bothering you. This helps you find out how much stress you feel and what is causing it. When you know this, you can find better ways to cope.  What can you do to prevent stress?  You might try some of these things to help prevent stress:  Manage your time. This helps you find time to do the things you want and need to do.  Get enough sleep. Your body recovers from the stresses of the day while you are sleeping.  Get support. Your family, friends, and community can make a difference in how you experience stress.  Limit your news feed. Avoid or limit time on social media or news that may make you feel stressed.  Do something active. Exercise or activity can help reduce stress. Walking is a great way to get started.  Where can you learn more?  Go to https://www.healthwise.net/patiented  Enter N032 in the  "search box to learn more about \"Learning About Stress.\"  Current as of: October 24, 2023               Content Version: 14.0    8713-9320 [employer].   Care instructions adapted under license by your healthcare professional. If you have questions about a medical condition or this instruction, always ask your healthcare professional. [employer] disclaims any warranty or liability for your use of this information.        Eat Better ? Move More ? Live Well     Eat 3 nutrient-rich meals each day    Don t skip meals--it will cause you to overeat later in the day!    Eating fiber (vegetables/fruits/whole grains) and protein with meals helps you stay full longer    Choose foods with less than 10 grams of sugar and 5 grams of fat per serving to prevent excess calories and weight re-gain  Eat around the same times each day to develop a routine eating schedule   Avoid snacking unless physically hungry.   Planned snacks: 1-2 times per day and no more than 150 calories    Eat protein first   Protein helps with healing, maintaining adequate muscle mass, reducing hunger and optimizing nutritional status   Aim for 60-80 grams of protein per day   Fill up on Fiber   Fiber comes from plants--fruits, veggies, whole grains, nuts/seeds and beans   Fiber is low in calories, high in phytonutrients and helps you stay full longer   Aim for 25-35 grams per day by eating fiber with meals and snacks  Eat [id number]   Take 20-30 minutes to eat each meal by taking small bites, chewing foods to applesauce consistency or 20-30 times before you swallow   Eating foods too fast can delay satiety/fullness signals and increase overeating   Slow down your eating by using toddler utensils, putting your fork/spoon down between bites and not watching TV or emailing during meals!   Keep a Journal         Writing down what you eat, how you feel and when you are active helps you identify new changes to work on from week to week   "       Look for ways to cut 100 calories from your current diet 2-3 times per day  Drink 64 ounces of 0-Calorie drinks between meals   Water   Zero calorie [employer]  or Vitamin Water     [employer]  Zero Calories   Crystal Light , Sugar-Free Collin-Aid , and other sugar-free lemonade or flavored fishman   Keep Caffeine to less than 300mg per day ie: 3-6oz cups coffee      Work up to 45-60 minutes of physical activity most days of the week   Helps with losing weight and prevent regaining those extra pounds!    Do a combo of cardio (walking/water exercises) and strength training (lifting weights/Vinyasa yoga)     Avoid Mindless Eating   Be present when you eat--take note of the smell, taste and quality of your food   Make a list of alternative activities you could do to prevent eating out of boredom/stress  Go for a walk, call a friend, chew gum, paint your nails, re-organize the garage, etc

## 2025-02-26 ENCOUNTER — TELEPHONE (OUTPATIENT)
Dept: GASTROENTEROLOGY | Facility: CLINIC | Age: 60
End: 2025-02-26
Payer: COMMERCIAL

## 2025-02-26 NOTE — TELEPHONE ENCOUNTER
"Endoscopy Scheduling Screen    Have you had any respiratory illness or flu-like symptoms in the last 10 days?  No    What is your communication preference for Instructions and/or Bowel Prep?   MyChart    What insurance is in the chart?  Other:      Ordering/Referring Provider:   MEG HODGSON        (If ordering provider performs procedure, schedule with ordering provider unless otherwise instructed. )    BMI: Estimated body mass index is 31.71 kg/m  as calculated from the following:    Height as of 4/8/24: 1.778 m (5' 10\").    Weight as of 4/8/24: 100.2 kg (221 lb).     Sedation Ordered  moderate sedation.   If patient BMI > 50 do not schedule in [facility].    If patient BMI > 45 do not schedule at [facility].    Are you taking methadone or Suboxone?  NO, No RN review required.    Have you been diagnosed and are being treated for severe PTSD or severe anxiety?  NO, No RN review required.    Are you taking any prescription medications for pain 3 or more times per week?   NO, No RN review required.    Do you have a history of malignant hyperthermia?  No    (Females) Are you currently pregnant?   No     Have you been diagnosed or told you have pulmonary hypertension?   No    Do you have an LVAD?  No    Have you been told you have moderate to severe sleep apnea?  No.    Have you been told you have COPD, asthma, or any other lung disease?  No    Do you  have a history of any heart conditions or any upcoming cardiac exams like an echo, angiogram, stress test, or ablation?  No     Have you ever had or are you waiting for an organ transplant?  No. Continue scheduling, no site restrictions.    Have you had a stroke or transient ischemic attack (TIA aka \"mini stroke\") in the last 2 years?   No.    Have you been diagnosed with or been told you have cirrhosis of the liver?   No.    Are you currently on dialysis?   No    Do you need assistance transferring?   No    BMI: Estimated body mass index is 31.71 kg/m  as calculated from the " "following:    Height as of 4/8/24: 1.778 m (5' 10\").    Weight as of 4/8/24: 100.2 kg (221 lb).     Is patients BMI > 40 and scheduling location [facility]?  No    Do you take an injectable or oral medication for weight loss or diabetes (excluding insulin)?  Yes, hold time can be up to 7 days. Please consult with you prescribing provider to discuss endoscopy recommendations. (Please schedule at least 7 days out.) SEMIGLUTIDE     Do you take the medication Naltrexone?  No    Do you take blood thinners?  No       Prep   Are you currently on dialysis or do you have chronic kidney disease?  No    Do you have a diagnosis of diabetes?  No    Do you have a diagnosis of cystic fibrosis (CF)?  No    On a regular basis do you go 3 -5 days between bowel movements?  No    BMI > 40?  No    Preferred Pharmacy:    [facility] [address]  [address] [address]  Phone: [phone] Fax: [phone] Alternate Fax: [phone], [phone]    Final Scheduling Details     Procedure scheduled  Colonoscopy    Surgeon:  REMA     Date of procedure:  03/28/2025     Pre-OP / PAC:   No - Not required for this site.    Location  [facility] - Per order.    Sedation   Moderate Sedation - Per order.      Patient Reminders:   You will receive a call from a Nurse to review instructions and health history.  This assessment must be completed prior to your procedure.  Failure to complete the Nurse assessment may result in the procedure being cancelled.      On the day of your procedure, please designate an adult(s) who can drive you home stay with you for the next 24 hours. The medicines used in the exam will make you sleepy. You will not be able to drive.      You cannot take public transportation, ride share services, or non-medical taxi service without a responsible caregiver.  Medical transport services are allowed with the requirement that a responsible caregiver will receive you " at your destination.  We require that drivers and caregivers are confirmed prior to your procedure.

## 2025-02-27 ENCOUNTER — TELEPHONE (OUTPATIENT)
Dept: GASTROENTEROLOGY | Facility: CLINIC | Age: 60
End: 2025-02-27
Payer: COMMERCIAL

## 2025-02-27 NOTE — TELEPHONE ENCOUNTER
Caller: Quirino    Reason for Reschedule/Cancellation (please be detailed, any staff messages or encounters to note?):   Patient has conflict    Did you cancel or rescheduled an [facility] procedure? No.    Is screening questionnaire older than 3 months from the reschedule date.   If Yes, please complete screening questionnaire. No    Prior to reschedule please review:  Ordering Provider: Salomón  Sedation Determined: CS  Does patient have any ASC Exclusions, please identify?: N    Notes on Cancelled Procedure:  Procedure: Lower Endoscopy [Colonoscopy]   Date: 3/28/25  Location: [facility]; [address] [place] [address]  Surgeon: Salomón    Rescheduled: Yes,   Procedure: Lower Endoscopy [Colonoscopy]    Date: 4/4/25   Location: [facility]; [address] [place] [address]   Surgeon: Salomón   Sedation Level Scheduled  CS ,  Reason for Sedation Level Protocol   Instructions updated and sent: Yes     Does patient need PAC or Pre -Op Rescheduled? : N

## 2025-04-04 ENCOUNTER — HOSPITAL ENCOUNTER (OUTPATIENT)
Facility: CLINIC | Age: 60
Discharge: HOME | End: 2025-04-04
Attending: INTERNAL MEDICINE | Admitting: INTERNAL MEDICINE
Payer: COMMERCIAL

## 2025-04-04 VITALS
RESPIRATION RATE: 16 BRPM | BODY MASS INDEX: 30.06 KG/M2 | DIASTOLIC BLOOD PRESSURE: 78 MMHG | SYSTOLIC BLOOD PRESSURE: 107 MMHG | HEIGHT: 70 IN | TEMPERATURE: 97 F | HEART RATE: 64 BPM | OXYGEN SATURATION: 97 % | WEIGHT: 210 LBS

## 2025-04-04 LAB — COLONOSCOPY: NORMAL

## 2025-04-04 PROCEDURE — G0121 COLON CA SCRN NOT HI RSK IND: HCPCS | Performed by: INTERNAL MEDICINE

## 2025-04-04 PROCEDURE — 45378 DIAGNOSTIC COLONOSCOPY: CPT | Performed by: INTERNAL MEDICINE

## 2025-04-04 PROCEDURE — G0500 MOD SEDAT ENDO SERVICE >5YRS: HCPCS | Performed by: INTERNAL MEDICINE

## 2025-04-04 PROCEDURE — 250N000011 HC RX IP 250 OP 636: Mod: JZ | Performed by: INTERNAL MEDICINE

## 2025-04-04 RX ORDER — NALOXONE HYDROCHLORIDE 0.4 MG/ML
0.2 INJECTION, SOLUTION INTRAMUSCULAR; INTRAVENOUS; SUBCUTANEOUS
Status: DISCONTINUED | OUTPATIENT
Start: 2025-04-04 | End: 2025-04-04 | Stop reason: HOSPADM

## 2025-04-04 RX ORDER — LIDOCAINE 40 MG/G
CREAM TOPICAL
Status: DISCONTINUED | OUTPATIENT
Start: 2025-04-04 | End: 2025-04-04 | Stop reason: HOSPADM

## 2025-04-04 RX ORDER — ONDANSETRON 4 MG/1
4 TABLET, ORALLY DISINTEGRATING ORAL EVERY 6 HOURS PRN
Status: DISCONTINUED | OUTPATIENT
Start: 2025-04-04 | End: 2025-04-04 | Stop reason: HOSPADM

## 2025-04-04 RX ORDER — NALOXONE HYDROCHLORIDE 0.4 MG/ML
0.4 INJECTION, SOLUTION INTRAMUSCULAR; INTRAVENOUS; SUBCUTANEOUS
Status: DISCONTINUED | OUTPATIENT
Start: 2025-04-04 | End: 2025-04-04 | Stop reason: HOSPADM

## 2025-04-04 RX ORDER — FLUMAZENIL 0.1 MG/ML
0.2 INJECTION, SOLUTION INTRAVENOUS
Status: DISCONTINUED | OUTPATIENT
Start: 2025-04-04 | End: 2025-04-04 | Stop reason: HOSPADM

## 2025-04-04 RX ORDER — ONDANSETRON 2 MG/ML
4 INJECTION INTRAMUSCULAR; INTRAVENOUS
Status: DISCONTINUED | OUTPATIENT
Start: 2025-04-04 | End: 2025-04-04 | Stop reason: HOSPADM

## 2025-04-04 RX ORDER — PROCHLORPERAZINE MALEATE 10 MG
10 TABLET ORAL EVERY 6 HOURS PRN
Status: DISCONTINUED | OUTPATIENT
Start: 2025-04-04 | End: 2025-04-04 | Stop reason: HOSPADM

## 2025-04-04 RX ORDER — ATROPINE SULFATE 0.1 MG/ML
1 INJECTION INTRAVENOUS
Status: DISCONTINUED | OUTPATIENT
Start: 2025-04-04 | End: 2025-04-04 | Stop reason: HOSPADM

## 2025-04-04 RX ORDER — DIPHENHYDRAMINE HYDROCHLORIDE 50 MG/ML
25-50 INJECTION, SOLUTION INTRAMUSCULAR; INTRAVENOUS
Status: DISCONTINUED | OUTPATIENT
Start: 2025-04-04 | End: 2025-04-04 | Stop reason: HOSPADM

## 2025-04-04 RX ORDER — ONDANSETRON 2 MG/ML
4 INJECTION INTRAMUSCULAR; INTRAVENOUS EVERY 6 HOURS PRN
Status: DISCONTINUED | OUTPATIENT
Start: 2025-04-04 | End: 2025-04-04 | Stop reason: HOSPADM

## 2025-04-04 RX ORDER — FENTANYL CITRATE 50 UG/ML
25-100 INJECTION, SOLUTION INTRAMUSCULAR; INTRAVENOUS EVERY 5 MIN PRN
Status: DISCONTINUED | OUTPATIENT
Start: 2025-04-04 | End: 2025-04-04 | Stop reason: HOSPADM

## 2025-04-04 RX ORDER — EPINEPHRINE 1 MG/ML
0.1 INJECTION, SOLUTION, CONCENTRATE INTRAVENOUS
Status: DISCONTINUED | OUTPATIENT
Start: 2025-04-04 | End: 2025-04-04 | Stop reason: HOSPADM

## 2025-04-04 RX ORDER — SIMETHICONE 40MG/0.6ML
133 SUSPENSION, DROPS(FINAL DOSAGE FORM)(ML) ORAL
Status: DISCONTINUED | OUTPATIENT
Start: 2025-04-04 | End: 2025-04-04 | Stop reason: HOSPADM

## 2025-04-04 RX ADMIN — FENTANYL CITRATE 100 MCG: 50 INJECTION, SOLUTION INTRAMUSCULAR; INTRAVENOUS at 08:52

## 2025-04-04 RX ADMIN — MIDAZOLAM 2 MG: 1 INJECTION INTRAMUSCULAR; INTRAVENOUS at 08:52

## 2025-04-04 ASSESSMENT — ACTIVITIES OF DAILY LIVING (ADL)
ADLS_ACUITY_SCORE: 41

## 2025-04-04 NOTE — DISCHARGE INSTRUCTIONS
The patient has received a copy of the Provation report the doctor has written and discharge instructions have been discussed with the patient and responsible adult.  All questions were addressed and answered prior to patient discharge.  Learning About Diverticulosis and Diverticulitis  What are diverticulosis and diverticulitis?    In diverticulosis and diverticulitis, pouches called diverticula form in the wall of the large intestine, or colon.  In diverticulosis, the pouches do not cause any pain or other symptoms.  In diverticulitis, the pouches get inflamed or infected and cause symptoms.  Doctors aren't sure what causes these pouches in the colon. But they think that a low-fiber diet may play a role. A low-fiber diet can cause small, hard stools. This means it takes more pressure in the colon to move stools out of the body. This puts more pressure on the walls of the colon. The pressure from this may cause pouches to form in weak spots along the colon.  What are the symptoms?  In diverticulosis, most people don't have symptoms.  In diverticulitis, symptoms may last from a few hours to a week or more. They include:  Belly pain. This is usually in the lower left side. It is sometimes worse when you move. This is the most common symptom.  Fever and chills.  Bloating and gas.  Diarrhea or constipation.  Nausea and sometimes vomiting.  Not feeling like eating.  If the pouches bleed, it is called diverticular bleeding.  How can you prevent diverticulitis?  You may be able to lower your chance of getting diverticulitis. You can do this by taking steps to prevent constipation.  Eat fruits, vegetables, beans, and whole grains every day. These foods are high in fiber.  Drink plenty of fluids. If you have kidney, heart, or liver disease and have to limit fluids, talk with your doctor before you increase the amount of fluids you drink.  Get at least 30 minutes of exercise on most days of the week. Walking is a good  "choice.  Take a fiber supplement (such as Citrucel or Metamucil) every day if needed. Read and follow all instructions on the label.  Schedule time each day for a bowel movement. Having a daily routine may help. Take your time and do not strain when having a bowel movement.  How are these problems treated?  The best way to treat diverticulosis is to avoid constipation. Treatment for diverticulitis includes antibiotics. It often includes a change in your diet. You may need only liquids at first. Your doctor may suggest medicines for pain or belly cramps. In some cases, surgery may be needed.  Follow-up care is a key part of your treatment and safety. Be sure to make and go to all appointments, and call your doctor if you are having problems. It's also a good idea to know your test results and keep a list of the medicines you take.  Where can you learn more?  Go to https://www.[employer].net/patiented  Enter E426 in the search box to learn more about \"Learning About Diverticulosis and Diverticulitis.\"  Current as of: October 19, 2024  Content Version: 14.4    [id number] [employer].   Care instructions adapted under license by your healthcare professional. If you have questions about a medical condition or this instruction, always ask your healthcare professional. [employer] disclaims any warranty or liability for your use of this information.  High-Fiber Diet: Care Instructions  Overview     A high-fiber diet may help you relieve constipation and feel less bloated.  Your doctor and dietitian will help you make a high-fiber eating plan based on your personal needs. The plan will include the things you like to eat. It will also make sure that you get 25 to 35 grams of fiber a day.  Before you make changes to the way you eat, be sure to talk with your doctor or dietitian.  Follow-up care is a key part of your treatment and safety. Be sure to make and go to all appointments, and call your doctor if " "you are having problems. It's also a good idea to know your test results and keep a list of the medicines you take.  How can you care for yourself at home?  You can increase how much fiber you get if you eat more of certain foods. These foods include:  Whole-grain breads and cereals.  Fruits, such as pears, apples, and peaches. Eat the skins and peels if you can.  Vegetables, such as broccoli, cabbage, spinach, carrots, asparagus, and squash.  Starchy vegetables. These include potatoes with skins, kidney beans, and lima beans.  Take a fiber supplement every day if your doctor recommends it. Examples are Benefiber, Citrucel, FiberCon, and Metamucil. Ask your doctor how much to take.  Drink plenty of fluids. If you have kidney, heart, or liver disease and have to limit fluids, talk with your doctor before you increase the amount of fluids you drink.  Where can you learn more?  Go to https://www.[employer].net/patiented  Enter U654 in the search box to learn more about \"High-Fiber Diet: Care Instructions.\"  Current as of: October 7, 2024  Content Version: 14.4 2024-2025 [employer].   Care instructions adapted under license by your healthcare professional. If you have questions about a medical condition or this instruction, always ask your healthcare professional. [employer] disclaims any warranty or liability for your use of this information.    "

## 2025-04-04 NOTE — H&P
Pre-Endoscopy History and Physical     Sina Stoner MRN# [id number]   YOB: 1965 Age: 59 year old     Date of Procedure: 4/4/2025  Primary care provider: Sina Herrera  Type of Endoscopy: Colonoscopy with possible biopsy, possible polypectomy  Reason for Procedure: screen  Type of Anesthesia Anticipated: Conscious Sedation    HPI:    Sina is a 59 year old male who will be undergoing the above procedure.      A history and physical has been performed. The patient's medications and allergies have been reviewed. The risks and benefits of the procedure and the sedation options and risks were discussed with the patient.  All questions were answered and informed consent was obtained.      He denies a personal or family history of anesthesia complications or bleeding disorders.     Patient Active Problem List   Diagnosis    Mixed hyperlipidemia    Attention deficit disorder    Hyperlipidemia LDL goal <100    Family history of coronary artery disease    Recurrent cold sores    Vitamin D deficiency    Anxiety    Panic attack    ED (erectile dysfunction)    Alopecia        Past Medical History:   Diagnosis Date    Depressive disorder, not elsewhere classified     Supraspinatus tendonitis 4/1/2011        Past Surgical History:   Procedure Laterality Date    COLONOSCOPY N/A 03/27/2015    Procedure: COLONOSCOPY;  Surgeon: Brien Lorenzana MD;  Location:  GI    HAND SURGERY Right 1984    right 5th metacarpal repair    RELEASE TRIGGER FINGER Left 12/18/2019    Left Trigger Thumb Release at A1 Dr. Unruly Chairez, [facility]    TONSILLECTOMY  1971       Social History     Tobacco Use    Smoking status: Never    Smokeless tobacco: Never   Substance Use Topics    Alcohol use: Yes     Alcohol/week: 0.0 standard drinks of alcohol     Comment: 0-2 beers per week       Family History   Problem Relation Age of Onset    Eye Disorder Mother     Psychotic Disorder Mother         dementia      "Gastrointestinal Disease Mother     Alzheimer Disease Mother     Arthritis Mother     Osteoporosis Mother     Hyperlipidemia Father     Coronary Artery Disease Father         MI at 51 yo    No Known Problems Brother     Hypertension Brother     Alzheimer Disease Maternal Grandmother     Cancer Maternal Grandfather     No Known Problems Daughter     No Known Problems Daughter     Prostate Cancer No family hx of     Colon Cancer No family hx of     Diabetes No family hx of     Breast Cancer No family hx of     Cerebrovascular Disease No family hx of        Prior to Admission medications    Medication Sig Start Date End Date Taking? Authorizing Provider   atorvastatin (LIPITOR) 20 MG tablet Take 1 tablet (20 mg) by mouth daily 4/8/24  Yes Sina Herrera MD   Cholecalciferol (VITAMIN D) 1000 UNIT capsule Take 1 capsule by mouth daily.   Yes Reported, Patient   finasteride (PROPECIA) 1 MG tablet Take 1 tablet (1 mg) by mouth daily 4/8/24  Yes Sina Herrera MD   Multiple Vitamin (MULTIVITAMINS PO) Take  by mouth daily.   Yes Reported, Patient   sildenafil (VIAGRA) 50 MG tablet Take 1 tablet (50 mg) by mouth daily as needed (erectile dysfunction) 4/8/24   Sina Herrera MD       No Known Allergies     REVIEW OF SYSTEMS:   5 point ROS negative except as noted above in HPI, including Gen., Resp., CV, GI &  system review.    PHYSICAL EXAM:   /80   Pulse 65   Temp 97  F (36.1  C) (Oral)   Resp 10   Ht 1.778 m (5' 10\")   Wt 95.3 kg (210 lb)   SpO2 95%   BMI 30.13 kg/m   Estimated body mass index is 30.13 kg/m  as calculated from the following:    Height as of this encounter: 1.778 m (5' 10\").    Weight as of this encounter: 95.3 kg (210 lb).   GENERAL APPEARANCE: alert, and oriented  MENTAL STATUS: alert  AIRWAY EXAM: Mallampatti Class I (visualization of the soft palate, fauces, uvula, anterior and posterior pillars)  RESP: lungs clear to auscultation - no rales, rhonchi or wheezes  CV: regular rates " and rhythm  DIAGNOSTICS:    Not indicated    IMPRESSION   ASA Class 2 - Mild systemic disease    PLAN:   Plan for Colonoscopy with possible biopsy, possible polypectomy. We discussed the risks, benefits and alternatives and the patient wished to proceed.    The above has been forwarded to the consulting provider.      Signed Electronically by: Brien Lorenzana MD  April 4, 2025

## 2025-04-07 ENCOUNTER — PATIENT OUTREACH (OUTPATIENT)
Dept: CARE COORDINATION | Facility: CLINIC | Age: 60
End: 2025-04-07
Payer: COMMERCIAL

## 2025-04-07 SDOH — HEALTH STABILITY: PHYSICAL HEALTH: ON AVERAGE, HOW MANY MINUTES DO YOU ENGAGE IN EXERCISE AT THIS LEVEL?: 50 MIN

## 2025-04-07 SDOH — HEALTH STABILITY: PHYSICAL HEALTH: ON AVERAGE, HOW MANY DAYS PER WEEK DO YOU ENGAGE IN MODERATE TO STRENUOUS EXERCISE (LIKE A BRISK WALK)?: 3 DAYS

## 2025-04-07 ASSESSMENT — SOCIAL DETERMINANTS OF HEALTH (SDOH): HOW OFTEN DO YOU GET TOGETHER WITH FRIENDS OR RELATIVES?: ONCE A WEEK

## 2025-04-08 ENCOUNTER — OFFICE VISIT (OUTPATIENT)
Dept: FAMILY MEDICINE | Facility: CLINIC | Age: 60
End: 2025-04-08
Attending: FAMILY MEDICINE
Payer: COMMERCIAL

## 2025-04-08 VITALS
HEIGHT: 71 IN | RESPIRATION RATE: 17 BRPM | BODY MASS INDEX: 29.54 KG/M2 | DIASTOLIC BLOOD PRESSURE: 78 MMHG | SYSTOLIC BLOOD PRESSURE: 98 MMHG | HEART RATE: 83 BPM | OXYGEN SATURATION: 98 % | TEMPERATURE: 97.7 F | WEIGHT: 211 LBS

## 2025-04-08 DIAGNOSIS — Z12.5 SCREENING FOR PROSTATE CANCER: ICD-10-CM

## 2025-04-08 DIAGNOSIS — Z13.0 SCREENING, DEFICIENCY ANEMIA, IRON: ICD-10-CM

## 2025-04-08 DIAGNOSIS — L65.9 ALOPECIA: ICD-10-CM

## 2025-04-08 DIAGNOSIS — Z00.00 ROUTINE GENERAL MEDICAL EXAMINATION AT A HEALTH CARE FACILITY: Primary | ICD-10-CM

## 2025-04-08 DIAGNOSIS — E78.5 HYPERLIPIDEMIA LDL GOAL <100: ICD-10-CM

## 2025-04-08 DIAGNOSIS — N52.9 ERECTILE DYSFUNCTION, UNSPECIFIED ERECTILE DYSFUNCTION TYPE: ICD-10-CM

## 2025-04-08 LAB
ALBUMIN SERPL BCG-MCNC: 4.4 G/DL (ref 3.5–5.2)
ALP SERPL-CCNC: 122 U/L (ref 40–150)
ALT SERPL W P-5'-P-CCNC: 75 U/L (ref 0–70)
ANION GAP SERPL CALCULATED.3IONS-SCNC: 14 MMOL/L (ref 7–15)
AST SERPL W P-5'-P-CCNC: 35 U/L (ref 0–45)
BILIRUB SERPL-MCNC: 0.9 MG/DL
BUN SERPL-MCNC: 11.3 MG/DL (ref 8–23)
CALCIUM SERPL-MCNC: 9.2 MG/DL (ref 8.8–10.4)
CHLORIDE SERPL-SCNC: 110 MMOL/L (ref 98–107)
CHOLEST SERPL-MCNC: 152 MG/DL
CREAT SERPL-MCNC: 1.12 MG/DL (ref 0.67–1.17)
EGFRCR SERPLBLD CKD-EPI 2021: 76 ML/MIN/1.73M2
ERYTHROCYTE [DISTWIDTH] IN BLOOD BY AUTOMATED COUNT: 12.5 % (ref 10–15)
FASTING STATUS PATIENT QL REPORTED: YES
FASTING STATUS PATIENT QL REPORTED: YES
GLUCOSE SERPL-MCNC: 104 MG/DL (ref 70–99)
HCO3 SERPL-SCNC: 19 MMOL/L (ref 22–29)
HCT VFR BLD AUTO: 43.9 % (ref 40–53)
HDLC SERPL-MCNC: 41 MG/DL
HGB BLD-MCNC: 14.7 G/DL (ref 13.3–17.7)
LDLC SERPL CALC-MCNC: 71 MG/DL
MCH RBC QN AUTO: 29.4 PG (ref 26.5–33)
MCHC RBC AUTO-ENTMCNC: 33.5 G/DL (ref 31.5–36.5)
MCV RBC AUTO: 88 FL (ref 78–100)
NONHDLC SERPL-MCNC: 111 MG/DL
PLATELET # BLD AUTO: 203 10E3/UL (ref 150–450)
POTASSIUM SERPL-SCNC: 4.2 MMOL/L (ref 3.4–5.3)
PROT SERPL-MCNC: 7 G/DL (ref 6.4–8.3)
PSA SERPL DL<=0.01 NG/ML-MCNC: 0.37 NG/ML (ref 0–3.5)
RBC # BLD AUTO: 5 10E6/UL (ref 4.4–5.9)
SODIUM SERPL-SCNC: 143 MMOL/L (ref 135–145)
TRIGL SERPL-MCNC: 201 MG/DL
WBC # BLD AUTO: 6.5 10E3/UL (ref 4–11)

## 2025-04-08 PROCEDURE — 3078F DIAST BP <80 MM HG: CPT | Performed by: FAMILY MEDICINE

## 2025-04-08 PROCEDURE — 80061 LIPID PANEL: CPT | Performed by: FAMILY MEDICINE

## 2025-04-08 PROCEDURE — 99396 PREV VISIT EST AGE 40-64: CPT | Performed by: FAMILY MEDICINE

## 2025-04-08 PROCEDURE — 80053 COMPREHEN METABOLIC PANEL: CPT | Performed by: FAMILY MEDICINE

## 2025-04-08 PROCEDURE — G0103 PSA SCREENING: HCPCS | Performed by: FAMILY MEDICINE

## 2025-04-08 PROCEDURE — 3074F SYST BP LT 130 MM HG: CPT | Performed by: FAMILY MEDICINE

## 2025-04-08 PROCEDURE — 85027 COMPLETE CBC AUTOMATED: CPT | Performed by: FAMILY MEDICINE

## 2025-04-08 PROCEDURE — 36415 COLL VENOUS BLD VENIPUNCTURE: CPT | Performed by: FAMILY MEDICINE

## 2025-04-08 RX ORDER — FINASTERIDE 1 MG/1
1 TABLET, FILM COATED ORAL DAILY
Qty: 90 TABLET | Refills: 4 | Status: SHIPPED | OUTPATIENT
Start: 2025-04-08

## 2025-04-08 RX ORDER — ATORVASTATIN CALCIUM 20 MG/1
20 TABLET, FILM COATED ORAL DAILY
Qty: 90 TABLET | Refills: 4 | Status: SHIPPED | OUTPATIENT
Start: 2025-04-08

## 2025-04-08 RX ORDER — SILDENAFIL 50 MG/1
50 TABLET, FILM COATED ORAL DAILY PRN
Qty: 30 TABLET | Refills: 11 | Status: SHIPPED | OUTPATIENT
Start: 2025-04-08

## 2025-04-08 ASSESSMENT — ANXIETY QUESTIONNAIRES
4. TROUBLE RELAXING: NOT AT ALL
7. FEELING AFRAID AS IF SOMETHING AWFUL MIGHT HAPPEN: NOT AT ALL
GAD7 TOTAL SCORE: 0
5. BEING SO RESTLESS THAT IT IS HARD TO SIT STILL: NOT AT ALL
8. IF YOU CHECKED OFF ANY PROBLEMS, HOW DIFFICULT HAVE THESE MADE IT FOR YOU TO DO YOUR WORK, TAKE CARE OF THINGS AT HOME, OR GET ALONG WITH OTHER PEOPLE?: NOT DIFFICULT AT ALL
IF YOU CHECKED OFF ANY PROBLEMS ON THIS QUESTIONNAIRE, HOW DIFFICULT HAVE THESE PROBLEMS MADE IT FOR YOU TO DO YOUR WORK, TAKE CARE OF THINGS AT HOME, OR GET ALONG WITH OTHER PEOPLE: NOT DIFFICULT AT ALL
7. FEELING AFRAID AS IF SOMETHING AWFUL MIGHT HAPPEN: NOT AT ALL
GAD7 TOTAL SCORE: 0
GAD7 TOTAL SCORE: 0
1. FEELING NERVOUS, ANXIOUS, OR ON EDGE: NOT AT ALL
6. BECOMING EASILY ANNOYED OR IRRITABLE: NOT AT ALL
2. NOT BEING ABLE TO STOP OR CONTROL WORRYING: NOT AT ALL
3. WORRYING TOO MUCH ABOUT DIFFERENT THINGS: NOT AT ALL

## 2025-04-08 NOTE — PROGRESS NOTES
"Preventive Care Visit  [facility]  Sina Herrera MD, Family Medicine  Apr 8, 2025        Assessment & Plan     Routine general medical examination at a health care facility      Hyperlipidemia LDL goal <100  Controlled - continue medication(s).  - COMPREHENSIVE METABOLIC PANEL  - Lipid panel reflex to direct LDL Non-fasting  - COMPREHENSIVE METABOLIC PANEL  - Lipid panel reflex to direct LDL Non-fasting  - atorvastatin (LIPITOR) 20 MG tablet  Dispense: 90 tablet; Refill: 4    Alopecia  Stable - continue medication(s).  - finasteride (PROPECIA) 1 MG tablet  Dispense: 90 tablet; Refill: 4    Erectile dysfunction, unspecified erectile dysfunction type  Stable - continue medication(s).  - sildenafil (VIAGRA) 50 MG tablet  Dispense: 30 tablet; Refill: 11    Screening for prostate cancer  - PROSTATE SPEC ANTIGEN SCREEN  - PROSTATE SPEC ANTIGEN SCREEN    Screening, deficiency anemia, iron  - CBC with Platelets  - CBC with Platelets    Consent was obtained from the patient to use an AI documentation tool in the creation of this note.      BMI  Estimated body mass index is 29.85 kg/m  as calculated from the following:    Height as of this encounter: 1.791 m (5' 10.5\").    Weight as of this encounter: 95.7 kg (211 lb).   Weight management plan: Discussed healthy diet and exercise guidelines      Counseling  Appropriate preventive services were addressed with this patient via screening, questionnaire, or discussion as appropriate for fall prevention, nutrition, physical activity, social engagement, weight loss and cognition.  Checklist reviewing preventive services available has been given to the patient.  Reviewed patient's diet, addressing concerns and/or questions.     Patient has been advised of split billing requirements and indicates understanding: Yes    Return in about 1 year (around 4/8/2026) for wellness exam with Alonzo Herrera MD.    Follow-up Visit   Expected date:  Apr 08, 2026 " (Approximate)      Follow Up Appointment Details:     Follow-up with whom?: PCP    Follow-Up for what?: Adult Preventive    How?: In Person                     Alonzo Herrera MD     [address] [address]  [employer]     Office: [phone]           Subjective   Quirino is a 59 year old, presenting for the following:  Physical    History of Present Illness    Cholesterol Management  He is here for a recheck of his chronic conditions, including cholesterol. He is currently taking medication for cholesterol management and has no side effects or problems with the medication. He wants to renew the prescription for the next year.    Alopecia  He is taking medication for hair loss (alopecia) and has no side effects or issues with the treatment. He wants to continue with the current medication regimen.    Erectile Dysfunction  He is on medication for erectile dysfunction and has no side effects or problems. He wants to renew the prescription for the next year.      Consent was obtained from the patient to use an AI documentation tool in the creation of this note.      Hyperlipidemia Follow-Up    Are you regularly taking any medication or supplement to lower your cholesterol?   Yes- Atorvastatin  Are you having muscle aches or other side effects that you think could be caused by your cholesterol lowering medication?  No    Anxiety   How are you doing with your anxiety since your last visit? No change  Are you having other symptoms that might be associated with anxiety? No  Have you had a significant life event? No   Are you feeling depressed? No  Do you have any concerns with your use of alcohol or other drugs? No    Social History     Tobacco Use    Smoking status: Never     Passive exposure: Never    Smokeless tobacco: Never   Vaping Use    Vaping status: Never Used   Substance Use Topics    Alcohol use: Yes     Comment: 0-2 beers per week    Drug use: No          3/30/2023     7:44 AM 4/8/2024     7:40 AM 4/8/2025     7:36 AM   ASPEN-7 SCORE   Total Score  0 (minimal anxiety) 0 (minimal anxiety)   Total Score 4 0 0        Patient-reported         3/30/2023     7:18 AM 4/8/2024     7:39 AM 4/7/2025    12:52 PM   PHQ   PHQ-9 Total Score 4 2 2    Q9: Thoughts of better off dead/self-harm past 2 weeks Not at all  Not at all Not at all       Patient-reported    Proxy-reported         4/7/2025    12:52 PM   Last PHQ-9   1.  Little interest or pleasure in doing things 0   2.  Feeling down, depressed, or hopeless 0   3.  Trouble falling or staying asleep, or sleeping too much 1   4.  Feeling tired or having little energy 1   5.  Poor appetite or overeating 0   6.  Feeling bad about yourself 0   7.  Trouble concentrating 0   8.  Moving slowly or restless 0   Q9: Thoughts of better off dead/self-harm past 2 weeks 0   PHQ-9 Total Score 2        Patient-reported         4/8/2025     7:36 AM   ASPEN-7    1. Feeling nervous, anxious, or on edge 0   2. Not being able to stop or control worrying 0   3. Worrying too much about different things 0   4. Trouble relaxing 0   5. Being so restless that it is hard to sit still 0   6. Becoming easily annoyed or irritable 0   7. Feeling afraid, as if something awful might happen 0   ASPEN-7 Total Score 0    If you checked any problems, how difficult have they made it for you to do your work, take care of things at home, or get along with other people? Not difficult at all       Patient-reported       Advance Care Planning  Patient does not have a Health Care Directive: Discussed advance care planning with patient; however, patient declined at this time.      4/7/2025   General Health   How would you rate your overall physical health? Good   Feel stress (tense, anxious, or unable to sleep) Only a little   (!) STRESS CONCERN      4/7/2025   Nutrition   Three or more servings of calcium each day? Yes   Diet: Low fat/cholesterol    Carbohydrate counting     Breakfast skipped   How many servings of fruit and vegetables per day? 4 or more   How many sweetened beverages each day? 0-1       Multiple values from one day are sorted in reverse-chronological order         4/7/2025   Exercise   Days per week of moderate/strenous exercise 3 days   Average minutes spent exercising at this level 50 min         4/7/2025   Social Factors   Frequency of gathering with friends or relatives Once a week   Worry food won't last until get money to buy more No   Food not last or not have enough money for food? No   Do you have housing? (Housing is defined as stable permanent housing and does not include staying ouside in a car, in a tent, in an abandoned building, in an overnight shelter, or couch-surfing.) Yes   Are you worried about losing your housing? No   Lack of transportation? No   Unable to get utilities (heat,electricity)? No         4/7/2025   Fall Risk   Fallen 2 or more times in the past year? No   Trouble with walking or balance? No          4/7/2025   Dental   Dentist two times every year? Yes           4/8/2024   TB Screening   Were you born outside of the [place]? No         Today's PHQ-9 Score:       4/7/2025    12:52 PM   PHQ-9 SCORE   PHQ-9 Total Score MyChart 2 (Minimal depression)   PHQ-9 Total Score 2        Patient-reported         4/7/2025   Substance Use   Alcohol more than 3/day or more than 7/wk No   Do you use any other substances recreationally? No     Social History     Tobacco Use    Smoking status: Never     Passive exposure: Never    Smokeless tobacco: Never   Vaping Use    Vaping status: Never Used   Substance Use Topics    Alcohol use: Yes     Comment: 0-2 beers per week    Drug use: No           4/7/2025   STI Screening   New sexual partner(s) since last STI/HIV test? No   Last PSA:   PSA   Date Value Ref Range Status   11/20/2020 0.77 0 - 4 ug/L Final     Comment:     Assay Method:  Chemiluminescence using Siemens Vista analyzer     Prostate Specific Antigen  "Screen   Date Value Ref Range Status   04/08/2024 0.44 0.00 - 3.50 ng/mL Final   02/11/2022 0.59 0.00 - 4.00 ug/L Final          Reviewed and updated as needed this visit by Provider   Tobacco  Allergies  Meds  Problems  Med Hx  Surg Hx  Fam Hx             Objective    Exam  BP 98/78   Pulse 83   Temp 97.7  F (36.5  C) (Tympanic)   Resp 17   Ht 1.791 m (5' 10.5\")   Wt 95.7 kg (211 lb)   SpO2 98%   BMI 29.85 kg/m     Estimated body mass index is 29.85 kg/m  as calculated from the following:    Height as of this encounter: 1.791 m (5' 10.5\").    Weight as of this encounter: 95.7 kg (211 lb).    Physical Exam  GENERAL: healthy, alert and no distress  EYES: Eyes grossly normal to inspection, PERRL and conjunctivae and sclerae normal  HENT: ear canals and TM's normal, nose and mouth without ulcers or lesions  NECK: no adenopathy, no asymmetry, masses, or scars and thyroid normal to palpation  RESP: lungs clear to auscultation - no rales, rhonchi or wheezes  BREAST: normal without masses, tenderness or nipple discharge and no palpable axillary masses or adenopathy  CV: regular rate and rhythm, normal S1 S2, no S3 or S4, no murmur, click or rub, no peripheral edema and peripheral pulses strong  ABDOMEN: soft, nontender, no hepatosplenomegaly, no masses and bowel sounds normal   (male): normal male genitalia without lesions or urethral discharge, no hernia  MS: no gross musculoskeletal defects noted, no edema  SKIN: no suspicious lesions or rashes  NEURO: Normal strength and tone, mentation intact and speech normal  PSYCH: mentation appears normal, affect normal/bright  LYMPH: no cervical, supraclavicular, axillary, or inguinal adenopathy   RECTAL: declined exam      Signed Electronically by: Sina Herrera MD    "

## 2025-04-08 NOTE — PATIENT INSTRUCTIONS
Patient Education   Preventive Care Advice   This is general advice given by our system to help you stay healthy. However, your care team may have specific advice just for you. Please talk to your care team about your preventive care needs.  Nutrition  Eat 5 or more servings of fruits and vegetables each day.  Try wheat bread, brown rice and whole grain pasta (instead of white bread, rice, and pasta).  Get enough calcium and vitamin D. Check the label on foods and aim for 100% of the RDA (recommended daily allowance).  Lifestyle  Exercise at least 150 minutes each week  (30 minutes a day, 5 days a week).  Do muscle strengthening activities 2 days a week. These help control your weight and prevent disease.  No smoking.  Wear sunscreen to prevent skin cancer.  Have a dental exam and cleaning every 6 months.  Yearly exams  See your health care team every year to talk about:  Any changes in your health.  Any medicines your care team has prescribed.  Preventive care, family planning, and ways to prevent chronic diseases.  Shots (vaccines)   HPV shots (up to age 26), if you've never had them before.  Hepatitis B shots (up to age 59), if you've never had them before.  COVID-19 shot: Get this shot when it's due.  Flu shot: Get a flu shot every year.  Tetanus shot: Get a tetanus shot every 10 years.  Pneumococcal, hepatitis A, and RSV shots: Ask your care team if you need these based on your risk.  Shingles shot (for age 50 and up)  General health tests  Diabetes screening:  Starting at age 35, Get screened for diabetes at least every 3 years.  If you are younger than age 35, ask your care team if you should be screened for diabetes.  Cholesterol test: At age 39, start having a cholesterol test every 5 years, or more often if advised.  Bone density scan (DEXA): At age 50, ask your care team if you should have this scan for osteoporosis (brittle bones).  Hepatitis C: Get tested at least once in your life.  STIs (sexually  transmitted infections)  Before age 24: Ask your care team if you should be screened for STIs.  After age 24: Get screened for STIs if you're at risk. You are at risk for STIs (including HIV) if:  You are sexually active with more than one person.  You don't use condoms every time.  You or a partner was diagnosed with a sexually transmitted infection.  If you are at risk for HIV, ask about PrEP medicine to prevent HIV.  Get tested for HIV at least once in your life, whether you are at risk for HIV or not.  Cancer screening tests  Cervical cancer screening: If you have a cervix, begin getting regular cervical cancer screening tests starting at age 21.  Breast cancer scan (mammogram): If you've ever had breasts, begin having regular mammograms starting at age 40. This is a scan to check for breast cancer.  Colon cancer screening: It is important to start screening for colon cancer at age 45.  Have a colonoscopy test every 10 years (or more often if you're at risk) Or, ask your provider about stool tests like a FIT test every year or Cologuard test every 3 years.  To learn more about your testing options, visit:   .  For help making a decision, visit:   https://bit.ly/ar51709.  Prostate cancer screening test: If you have a prostate, ask your care team if a prostate cancer screening test (PSA) at age 55 is right for you.  Lung cancer screening: If you are a current or former smoker ages 50 to 80, ask your care team if ongoing lung cancer screenings are right for you.  For informational purposes only. Not to replace the advice of your health care provider. Copyright   2023 [employer]. All rights reserved. Clinically reviewed by the [facility]. Web and Rank [id number] - REV 01/24.

## 2025-04-09 NOTE — RESULT ENCOUNTER NOTE
Dear Quirino,    Here is a summary of your recent test results:  -Normal red blood cell (hgb) levels, normal white blood cell count and normal platelet levels.  -PSA (prostate specific antigen) test is normal.  This indicates a low likelihood of prostate cancer.  ADVISE: rechecking this in 1 year.  -Triglycerides are elevated which can increase your heart disease risk.  A diet high in fat and simple carbohydrates, genetics and being overweight can contribute to this.  Your LDL(bad) cholesterol and HDL(good) cholesterol levels are normal.  ADVISE: exercising 150 minutes of aerobic exercise per week (30 minutes 5 days per week or 50 minutes 3 days per week are options), weight control, and omega-3 fatty acids (fish oil) 0846-1227 mg daily are helpful to improve this.     -Liver and gallbladder tests (ALT,AST, Alk phos,bilirubin) are essentially normal.  -Kidney function (GFR) is normal.  -Sodium is normal.  -Potassium is normal.  -Calcium is normal.  -Glucose is slight elevated and may be a sign of early diabetes (prediabetes). ADVISE:: eating a low carbohydrate diet, exercising, trying to lose weight (if necessary) and rechecking your glucose level in 12 months.    For additional lab test information, www.testing.com is a very good reference.    In addition, here is a list of due or overdue Health Maintenance reminders:  Pneumococcal Vaccine(1 of 1 - PCV) Never done  Zoster (Shingles) Vaccine(1 of 2) Never done    Please call us at [phone] (or use [employer] to address the above recommendations if needed.           Thank you very much for trusting me and [facility].     Have a peaceful day.    Healthy regards,  Alonzo Herrera MD

## 2025-07-02 ENCOUNTER — OFFICE VISIT (OUTPATIENT)
Dept: URGENT CARE | Facility: URGENT CARE | Age: 60
End: 2025-07-02
Payer: COMMERCIAL

## 2025-07-02 VITALS
HEIGHT: 70 IN | RESPIRATION RATE: 16 BRPM | TEMPERATURE: 98.3 F | OXYGEN SATURATION: 97 % | SYSTOLIC BLOOD PRESSURE: 115 MMHG | DIASTOLIC BLOOD PRESSURE: 79 MMHG | BODY MASS INDEX: 29.63 KG/M2 | WEIGHT: 207 LBS | HEART RATE: 81 BPM

## 2025-07-02 DIAGNOSIS — L08.9 SKIN INFECTION: Primary | ICD-10-CM

## 2025-07-02 PROCEDURE — 3078F DIAST BP <80 MM HG: CPT | Performed by: FAMILY MEDICINE

## 2025-07-02 PROCEDURE — 99213 OFFICE O/P EST LOW 20 MIN: CPT | Performed by: FAMILY MEDICINE

## 2025-07-02 PROCEDURE — 3074F SYST BP LT 130 MM HG: CPT | Performed by: FAMILY MEDICINE

## 2025-07-02 RX ORDER — SULFAMETHOXAZOLE AND TRIMETHOPRIM 800; 160 MG/1; MG/1
1 TABLET ORAL 2 TIMES DAILY
Qty: 14 TABLET | Refills: 0 | Status: SHIPPED | OUTPATIENT
Start: 2025-07-02 | End: 2025-07-09

## 2025-07-02 NOTE — PATIENT INSTRUCTIONS
Please start taking Bactrim twice a day for the next 7 days to treat this skin infection on your right elbow.  Like many antibiotics, Bactrim can cause increased sun sensitivity so be diligent about using sun protection while you're on this medication and for a few days after you finish it.    Continue to watch for signs of spreading infection and return for recheck if you see any.  Return to urgent care if you're not seeing any improvement at all after a full 3 days on antibiotics, sooner if worsening.

## 2025-07-02 NOTE — PROGRESS NOTES
Urgent Care Clinic Visit    Chief Complaint   Patient presents with    Elbow Injury     Right elbow injury---injured @10 days ago and is now red and swollen               7/2/2025     6:19 PM   Additional Questions   Roomed by Wendy Hager/[facility]---[facility]

## 2025-07-02 NOTE — PROGRESS NOTES
"  ICD-10-CM    1. Skin infection  L08.9 sulfamethoxazole-trimethoprim (BACTRIM DS) 800-160 MG tablet        No fluctuance to suggest significant abscess formation here.  No evidence of underlying olecranon bursitis at this time.  Skin infection appears localized for now.  Will start oral antibiotics with MRSa coverage given patient's description of this looking like a pimple.    PLAN:  Patient Instructions   Please start taking Bactrim twice a day for the next 7 days to treat this skin infection on your right elbow.  Like many antibiotics, Bactrim can cause increased sun sensitivity so be diligent about using sun protection while you're on this medication and for a few days after you finish it.    Continue to watch for signs of spreading infection and return for recheck if you see any.  Return to urgent care if you're not seeing any improvement at all after a full 3 days on antibiotics, sooner if worsening.    SUBJECTIVE:  Sina Stoner is a 60 year old male who presents to  today with concerns about infection on R elbow.  About 10 days ago he accidentally struck the tip of his R elbow on a car door, and this was healing well until 2 days ago when he noticed more redness and swelling, kind of like a pimple in appearance.  This has continued to grow and he's concerned about infection.  Hasn't had any fevers or chills.  No other areas of concern on the skin.    OBJECTIVE:  /79 (BP Location: Right arm, Patient Position: Chair, Cuff Size: Adult Large)   Pulse 81   Temp 98.3  F (36.8  C) (Oral)   Resp 16   Ht 1.778 m (5' 10\")   Wt 93.9 kg (207 lb)   SpO2 97%   BMI 29.70 kg/m    GEN: well-appearing, in NAD  R elbow:  2 cm round patch of erythema over the olecranon process with a small scab over a central slightly raised areay.  There is no fluctuance.  There are no red streaks extending proximally.  "

## (undated) DEVICE — KIT ENDO TURNOVER/PROCEDURE W/CLEAN A SCOPE LINERS 103888

## (undated) RX ORDER — FENTANYL CITRATE 50 UG/ML
INJECTION, SOLUTION INTRAMUSCULAR; INTRAVENOUS
Status: DISPENSED
Start: 2025-04-04